# Patient Record
Sex: FEMALE | Race: WHITE | NOT HISPANIC OR LATINO | Employment: FULL TIME | URBAN - METROPOLITAN AREA
[De-identification: names, ages, dates, MRNs, and addresses within clinical notes are randomized per-mention and may not be internally consistent; named-entity substitution may affect disease eponyms.]

---

## 2017-01-04 ENCOUNTER — OFFICE VISIT (OUTPATIENT)
Dept: CARDIOLOGY | Facility: CLINIC | Age: 37
End: 2017-01-04

## 2017-01-04 VITALS
BODY MASS INDEX: 17.08 KG/M2 | SYSTOLIC BLOOD PRESSURE: 152 MMHG | WEIGHT: 122 LBS | HEART RATE: 122 BPM | DIASTOLIC BLOOD PRESSURE: 96 MMHG | HEIGHT: 71 IN

## 2017-01-04 DIAGNOSIS — K50.119 CROHN'S DISEASE OF COLON, UNSPECIFIED COMPLICATION (HCC): ICD-10-CM

## 2017-01-04 DIAGNOSIS — R00.0 TACHYCARDIA: Primary | ICD-10-CM

## 2017-01-04 PROCEDURE — 93000 ELECTROCARDIOGRAM COMPLETE: CPT | Performed by: INTERNAL MEDICINE

## 2017-01-04 PROCEDURE — 99204 OFFICE O/P NEW MOD 45 MIN: CPT | Performed by: INTERNAL MEDICINE

## 2017-01-04 RX ORDER — PREDNISONE 1 MG/1
7.5 TABLET ORAL
COMMUNITY
Start: 2016-11-16

## 2017-01-04 RX ORDER — TRAMADOL HYDROCHLORIDE 300 MG/1
TABLET, EXTENDED RELEASE ORAL
Refills: 1 | COMMUNITY
Start: 2016-12-29

## 2017-01-04 RX ORDER — FENTANYL 25 UG/H
PATCH TRANSDERMAL
Refills: 0 | COMMUNITY
Start: 2016-11-05 | End: 2017-03-14

## 2017-01-04 RX ORDER — PREDNISONE 1 MG/1
TABLET ORAL
COMMUNITY
Start: 2016-11-16 | End: 2018-01-31 | Stop reason: DRUGHIGH

## 2017-01-04 RX ORDER — VENLAFAXINE HYDROCHLORIDE 150 MG/1
CAPSULE, EXTENDED RELEASE ORAL
Refills: 2 | COMMUNITY
Start: 2016-12-28 | End: 2017-03-14

## 2017-01-04 NOTE — PROGRESS NOTES
Date of Office Visit: 2017  Encounter Provider: Claudia Wetzel MD  Place of Service: [facility] CARDIOLOGY  Patient Name: Porsha Vega  :1980      Patient ID:  Porsah Vega is a 36 y.o. female is here for tachycardia.         History of Present Illness     The patient is here today for sinus tachycardia. She got sick in 2016 and came to the emergency department. They did not really know what was going on and dismissed her, but her heart rate was high. She followed with Dr. Lewis, and her heart rate was high with Dr. Lewis as well, but she was otherwise feeling better. However, they discovered that she had transaminitis which they thought was due to liver inflammation, and she was sent to Dr. Chato Mccloud. Dr. Mccloud felt that it might be in part related to her medications for her Crohn disease, and so they began the process of trying to cull down her Crohn disease medications. She has had Crohn disease which has been in remission since . With the tachycardia, she did have her thyroid checked, and this was normal. She has had intermittent elevations of her blood pressure, but it never seems to be sustained. She does take Synthroid for her thyroid. In addition, she is on prednisone, venlafaxine and tramadol.     She has never had any significant use of caffeine, alcohol or decongestants. She does not have any history of sleep apnea, blood clots or cancer. She has no underlying asthma or emphysema; no kidney problems; no seizure, stroke or aneurysm. She has never had rheumatic fever or valvular heart disease of any sort. She has never had heart failure, diabetes mellitus or hyperlipidemia. She is not currently having any issues with her sinuses.     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 This has been very  stressful.     She does not exercise regularly. She uses no illicit drugs. She does not drink a lot of water.     There is a family history of atrial fibrillation in her dad, also a TIA. There is also some hypertension in her paternal grandmother and her dad.         Past Medical History   Diagnosis Date   • Anemia    • Anxiety    • Arthritis    • Asthma    • Crohn's disease      arthropathy   • GERD (gastroesophageal reflux disease)    • Hypertension    • Hypothyroidism    • IBS (irritable bowel syndrome)    • MRSA (methicillin resistant Staphylococcus aureus)    • Pancreatitis    • PUD (peptic ulcer disease)    • Seasonal allergies    • Tachycardia          Past Surgical History   Procedure Laterality Date   • Cholecystectomy     • Other surgical history       laparoscopic surgery for endometriosis       Current Outpatient Prescriptions on File Prior to Visit   Medication Sig Dispense Refill   • ALPRAZolam (XANAX) 0.25 MG tablet Take 0.25 mg by mouth Daily.     • amitriptyline (ELAVIL) 25 MG tablet Take 25 mg by mouth Every Night.     • medroxyPROGESTERone (DEPO-PROVERA) 150 MG/ML injection Inject 150 mg into the shoulder, thigh, or buttocks Every 3 (Three) Months.     • Calcium Carbonate (CALCIUM 600 PO) Take  by mouth 2 (Two) Times a Day.     • CloNIDine (CATAPRES) 0.1 MG tablet Take 0.1 mg by mouth 2 (Two) Times a Day.     • escitalopram (LEXAPRO) 20 MG tablet Take 10 mg by mouth Daily.     • meclizine (ANTIVERT) 25 MG tablet Take 25 mg by mouth Every 8 (Eight) Hours As Needed for dizziness.       No current facility-administered medications on file prior to visit.        Social History     Social History   • Marital status:      Spouse name: N/A   • Number of children: N/A   • Years of education: N/A     Occupational History   • Not on file.     Social History Main Topics   • Smoking status: Never Smoker   • Smokeless tobacco: Not on file   • Alcohol use No   • Drug use: No   • Sexual activity: Not on  "file     Other Topics Concern   • Not on file     Social History Narrative           Review of Systems   Constitution: Positive for malaise/fatigue.   HENT: Negative for congestion and headaches.    Eyes: Negative for vision loss in left eye and vision loss in right eye.   Respiratory: Positive for shortness of breath. Negative for cough, hemoptysis, sleep disturbances due to breathing, snoring, sputum production and wheezing.    Endocrine: Negative.    Hematologic/Lymphatic: Negative.    Skin: Negative for poor wound healing and rash.   Musculoskeletal: Positive for joint pain. Negative for falls, gout, muscle cramps and myalgias.   Gastrointestinal: Negative for abdominal pain, diarrhea, dysphagia, hematemesis, melena, nausea and vomiting.   Neurological: Negative for excessive daytime sleepiness, dizziness, light-headedness, loss of balance, seizures and vertigo.   Psychiatric/Behavioral: Negative for depression and substance abuse. The patient is not nervous/anxious.        Procedures    ECG 12 Lead  Date/Time: 1/4/2017 12:50 PM  Performed by: LULÚ DOSHI  Authorized by: LULÚ DOSHI   Comparison: not compared with previous ECG   Rhythm: sinus tachycardia  Clinical impression: non-specific ECG               Objective:      Vitals:    01/04/17 1225   BP: 152/96   Pulse: (!) 122   Weight: 122 lb (55.3 kg)   Height: 71\" (180.3 cm)     Body mass index is 17.02 kg/(m^2).    Physical Exam   Constitutional: She is oriented to person, place, and time. She appears well-developed and well-nourished. No distress.   HENT:   Head: Normocephalic and atraumatic.   Eyes: Conjunctivae are normal. No scleral icterus.   Neck: Neck supple. No JVD present. Carotid bruit is not present. No thyromegaly present.   Cardiovascular: Regular rhythm, S1 normal, S2 normal, normal heart sounds and intact distal pulses.   No extrasystoles are present. Tachycardia present.  PMI is not displaced.  Exam reveals no gallop.    No " murmur heard.  Pulses:       Carotid pulses are 2+ on the right side, and 2+ on the left side.       Radial pulses are 2+ on the right side, and 2+ on the left side.        Dorsalis pedis pulses are 2+ on the right side, and 2+ on the left side.        Posterior tibial pulses are 2+ on the right side, and 2+ on the left side.   Pulmonary/Chest: Effort normal and breath sounds normal. No respiratory distress. She has no wheezes. She has no rhonchi. She has no rales. She exhibits no tenderness.   Abdominal: Soft. Bowel sounds are normal. She exhibits no distension, no abdominal bruit and no mass. There is no tenderness.   Musculoskeletal: She exhibits no edema or deformity.   Lymphadenopathy:     She has no cervical adenopathy.   Neurological: She is alert and oriented to person, place, and time. No cranial nerve deficit.   Skin: Skin is warm and dry. No rash noted. She is not diaphoretic. No cyanosis. No pallor. Nails show no clubbing.   Psychiatric: She has a normal mood and affect. Judgment normal.   Vitals reviewed.      Lab Review:       Assessment:      Diagnosis Plan   1. Tachycardia     2. Crohn's disease of colon, unspecified complication       1. Sinus tachycardia, like reactive to hepatic inflammation, stress, deconditioning and relative dehydration.   2. Crohn's disease, in remission with last flare in 2012.  Sees Dr. Lewis.  3. Transaminitis, seeing Dr. Chato Mccloud for this liver inflammation which may be due to meds but is still occurring despite medication changes.   4. Stress with losing their house.      Plan:        Advised cardiovascular exercise, 64 ounces of water daily and increased protein consumption. I suspect that the underlying factors in her life currently are driving some of this tachycardia. She does not use any decongestants and no significant amount of alcohol, and she uses very little caffeine. In addition, her thyroid has been well controlled.     See back in 3 months.

## 2017-01-04 NOTE — MR AVS SNAPSHOT
Porsha Vega   1/4/2017 12:00 PM   Office Visit    Dept Phone:  [phone]   Encounter #:  [id number]    Provider:  Claudia Wetzel MD   Department:  [facility] CARDIOLOGY                Your Full Care Plan              Your Updated Medication List          This list is accurate as of: 1/4/17  1:24 PM.  Always use your most recent med list.                ALPRAZolam 0.25 MG tablet   Commonly known as:  XANAX       amitriptyline 25 MG tablet   Commonly known as:  ELAVIL       CALCIUM 600 PO       CloNIDine 0.1 MG tablet   Commonly known as:  CATAPRES       escitalopram 20 MG tablet   Commonly known as:  LEXAPRO       fentaNYL 25 MCG/HR patch   Commonly known as:  DURAGESIC       meclizine 25 MG tablet   Commonly known as:  ANTIVERT       medroxyPROGESTERone 150 MG/ML injection   Commonly known as:  DEPO-PROVERA       * predniSONE 5 MG tablet   Commonly known as:  DELTASONE       * predniSONE 1 MG tablet   Commonly known as:  DELTASONE       traMADol  MG 24 hr tablet   Commonly known as:  ULTRAM-ER       venlafaxine  MG 24 hr capsule   Commonly known as:  EFFEXOR-XR       * Notice:  This list has 2 medication(s) that are the same as other medications prescribed for you. Read the directions carefully, and ask your doctor or other care provider to review them with you.            We Performed the Following     ECG 12 Lead       You Were Diagnosed With        Codes Comments    Tachycardia    -  Primary ICD-10-CM: R00.0  ICD-9-CM: 785.0     Crohn's disease of colon, unspecified complication     ICD-10-CM: K50.119  ICD-9-CM: 555.1       Instructions     None    Patient Instructions History      Upcoming Appointments     Visit Type Date Time Department    NEW PATIENT 1/4/2017 12:00 PM [facility] [place]    FOLLOW UP 4/12/2017 12:40 PM [facility]      MyChart Signup     [facility] [employer] allows you to send messages to your doctor, view your  "test results, renew your prescriptions, schedule appointments, and more. To sign up, go to [employer] and click on the Sign Up Now link in the New User? box. Enter your [employer] Activation Code exactly as it appears below along with the last four digits of your Social Security Number and your Date of Birth () to complete the sign-up process. If you do not sign up before the expiration date, you must request a new code.    [employer] Activation Code: [email or web address]  Expires: 2017  1:24 PM    If you have questions, you can email [email or web address] or call [phone] to talk to our [employer] staff. Remember, [employer] is NOT to be used for urgent needs. For medical emergencies, dial 911.               Other Info from Your Visit           Your Appointments     2017 12:40 PM EDT   Follow Up with Claudia Wetzel MD   [facility] CARDIOLOGY (--)    [address] [place] [address]. [address]  [facility] [address] [phone]           Arrive 15 minutes prior to appointment.              Allergies     Demerol [Meperidine]      Remicade [Infliximab]        Reason for Visit     Rapid Heart Rate           Vital Signs     Blood Pressure Pulse Height Weight Body Mass Index Smoking Status    152/96 122 71\" (180.3 cm) 122 lb (55.3 kg) 17.02 kg/m2 Never Smoker      Problems and Diagnoses Noted     Regional colitis    Fast heart beat      Results         "

## 2017-01-06 ENCOUNTER — OFFICE (OUTPATIENT)
Dept: URBAN - METROPOLITAN AREA CLINIC 75 | Facility: CLINIC | Age: 37
End: 2017-01-06

## 2017-01-06 VITALS
HEIGHT: 71 IN | WEIGHT: 143 LBS | HEART RATE: 112 BPM | SYSTOLIC BLOOD PRESSURE: 110 MMHG | DIASTOLIC BLOOD PRESSURE: 68 MMHG

## 2017-01-06 DIAGNOSIS — K58.9 IRRITABLE BOWEL SYNDROME WITHOUT DIARRHEA: ICD-10-CM

## 2017-01-06 DIAGNOSIS — T50.905A ADVERSE EFFECT OF UNSPECIFIED DRUGS, MEDICAMENTS AND BIOLOGI: ICD-10-CM

## 2017-01-06 DIAGNOSIS — K75.81 NONALCOHOLIC STEATOHEPATITIS (NASH): ICD-10-CM

## 2017-01-06 DIAGNOSIS — R74.8 ABNORMAL LEVELS OF OTHER SERUM ENZYMES: ICD-10-CM

## 2017-01-06 DIAGNOSIS — K50.90 CROHN'S DISEASE, UNSPECIFIED, WITHOUT COMPLICATIONS: ICD-10-CM

## 2017-01-06 DIAGNOSIS — R10.10 UPPER ABDOMINAL PAIN, UNSPECIFIED: ICD-10-CM

## 2017-01-06 DIAGNOSIS — K21.9 GASTRO-ESOPHAGEAL REFLUX DISEASE WITHOUT ESOPHAGITIS: ICD-10-CM

## 2017-01-06 DIAGNOSIS — Z79.52 LONG TERM (CURRENT) USE OF SYSTEMIC STEROIDS: ICD-10-CM

## 2017-01-06 PROCEDURE — 99215 OFFICE O/P EST HI 40 MIN: CPT

## 2017-03-09 ENCOUNTER — TELEPHONE (OUTPATIENT)
Dept: CARDIOLOGY | Facility: CLINIC | Age: 37
End: 2017-03-09

## 2017-03-09 NOTE — TELEPHONE ENCOUNTER
03/09/17  9:29 AM  Porsha Vega  1980    Home Phone [phone]   Mobile [phone]     Ms. Vega calls to be seen sooner than her appt with Dr. Wetzel on 4/12/17. She states that she has been noticing that her heart has been skipping beats more often recently. She has not measured her HR or BP. She feels lightheaded with activity.     At her last visit, Dr. Wetzel recommended increasing her water and protein intake. She states she is doing this, but that she is losing weight.     I scheduled her to see Paula Ramirez on 3/14 at 1130 when Dr. Wetzel is in clinic.     Binta LYNCH RN

## 2017-03-14 ENCOUNTER — OFFICE VISIT (OUTPATIENT)
Dept: CARDIOLOGY | Facility: CLINIC | Age: 37
End: 2017-03-14

## 2017-03-14 ENCOUNTER — TELEPHONE (OUTPATIENT)
Dept: CARDIOLOGY | Facility: CLINIC | Age: 37
End: 2017-03-14

## 2017-03-14 VITALS
HEART RATE: 122 BPM | SYSTOLIC BLOOD PRESSURE: 104 MMHG | HEIGHT: 71 IN | DIASTOLIC BLOOD PRESSURE: 80 MMHG | WEIGHT: 140.5 LBS | BODY MASS INDEX: 19.67 KG/M2

## 2017-03-14 DIAGNOSIS — M19.90 ARTHRITIS: ICD-10-CM

## 2017-03-14 DIAGNOSIS — K50.119 CROHN'S DISEASE OF COLON, UNSPECIFIED COMPLICATION (HCC): ICD-10-CM

## 2017-03-14 DIAGNOSIS — R00.0 TACHYCARDIA: Primary | ICD-10-CM

## 2017-03-14 DIAGNOSIS — R50.9 LOW GRADE FEVER: ICD-10-CM

## 2017-03-14 PROCEDURE — 93000 ELECTROCARDIOGRAM COMPLETE: CPT | Performed by: NURSE PRACTITIONER

## 2017-03-14 PROCEDURE — 99213 OFFICE O/P EST LOW 20 MIN: CPT | Performed by: NURSE PRACTITIONER

## 2017-03-14 RX ORDER — FLUOXETINE HYDROCHLORIDE 20 MG/1
20 CAPSULE ORAL DAILY
COMMUNITY

## 2017-03-14 NOTE — PROGRESS NOTES
Date of Office Visit: 2017  Encounter Provider: ROXANA Holly  Place of Service: [facility] CARDIOLOGY  Patient Name: Porsha Vega  :1980    Chief Complaint   Patient presents with   • Rapid Heart Rate   :     HPI: Porsha Vega is a 37 y.o. female comes in today for the lightheadedness and palpitations.  She is a patient of Dr. Wetzel.  I'm seeing her for the first time today and have reviewed her records. She first came to see Dr. Wetzel in 2017.  In 2016 she came to the emergency room was found to have a high heart rate.She followed with Dr. Lewis, and her heart rate was high with Dr. Lewis as well, but she was otherwise feeling better. However, they discovered that she had transaminitis which they thought was due to liver inflammation, and she was sent to Dr. Chato Mccloud. Dr. Mccloud felt that it might be in part related to her medications for her Crohn disease, and so they began the process of trying to cut down her Crohn disease medications. She has had Crohn disease which has been in remission since . With the tachycardia, she did have her thyroid checked, and this was normal. She has had intermittent elevations of her blood pressure, but it never seems to be sustained. She does take Synthroid for her thyroid. In addition, she is on prednisone, venlafaxine and tramadol.     Dr. Wetzel felt that her tachycardia was likely reactive and related to hepatic inflammation, stress, deconditioning and dehydration. Advised exercise, increase water intake and protein consumption.     She called on 3/9/17, requesting that she be able to come in earlier than her recommended 3 months appt due to the increase of lightheadedness with activity and feeling of her heart skipping beats.     Today, she comes in with these complaints. She is in the process of moving and has been doing strenous work over the past week. She has noticed heart beating harder and felt  like it will skip a beat. She seems like the 4th beat it will skip. She was started on effexor about 2 months prior and felt like her muscles were getting sore due to the medication. She stopped this medication about 3 weeks and still feels muscle soreness and a feeling of her leg shaking when standing. She does feel like she is going to pass out when has been standing for a long period of time. Dizziness when stand up or change position and/or move head. Seh then starts becoming diaporetic. Low grade fever over the past week over the past couple of days. She has previuosly seen a rheumatologist.     Past Medical History   Diagnosis Date   • Anemia    • Anxiety    • Arthritis    • Asthma    • Crohn's disease      arthropathy   • GERD (gastroesophageal reflux disease)    • Hypertension    • Hypothyroidism    • IBS (irritable bowel syndrome)    • MRSA (methicillin resistant Staphylococcus aureus)    • Pancreatitis    • PUD (peptic ulcer disease)    • Seasonal allergies    • Tachycardia        Past Surgical History   Procedure Laterality Date   • Cholecystectomy     • Other surgical history       laparoscopic surgery for endometriosis           Review of Systems   Constitution: Positive for fever (low grade) and malaise/fatigue.   HENT: Positive for sore throat. Negative for ear pain, hearing loss and nosebleeds.    Eyes: Negative for double vision, pain, vision loss in left eye, vision loss in right eye and visual disturbance.   Cardiovascular: Positive for dyspnea on exertion. Negative for claudication and leg swelling.   Respiratory: Negative for cough, snoring and wheezing.    Endocrine: Negative for cold intolerance, heat intolerance and polyuria.   Skin: Negative for color change, itching and rash.   Musculoskeletal: Positive for joint pain and joint swelling. Negative for muscle cramps.   Gastrointestinal: Positive for abdominal pain. Negative for diarrhea, melena, nausea and vomiting.   Genitourinary: Negative  "for bladder incontinence and hematuria.   Neurological: Positive for paresthesias. Negative for excessive daytime sleepiness, dizziness, light-headedness and seizures.   Psychiatric/Behavioral: Negative for depression. The patient is not nervous/anxious.    All other systems reviewed and are negative.    All other systems reviewed and are negative    Allergies   Allergen Reactions   • Demerol [Meperidine]    • Remicade [Infliximab]        All aspects of family and social history reviewed.          Objective:     Vitals:    03/14/17 0920   BP: 104/80   BP Location: Right arm   Pulse: (!) 122   Weight: 140 lb 8 oz (63.7 kg)   Height: 71\" (180.3 cm)     Body mass index is 19.6 kg/(m^2).    PHYSICAL EXAM:  Physical Exam   Constitutional: She is oriented to person, place, and time. She appears well-developed and well-nourished. No distress.   HENT:   Head: Normocephalic and atraumatic.   Eyes: Conjunctivae are normal. No scleral icterus.   Neck: Neck supple. No JVD present. Carotid bruit is not present. No thyromegaly present.   Cardiovascular: Regular rhythm, S1 normal, S2 normal, normal heart sounds and intact distal pulses.   No extrasystoles are present. Tachycardia present.  PMI is not displaced.  Exam reveals no gallop.    No murmur heard.  Pulses:       Carotid pulses are 2+ on the right side, and 2+ on the left side.       Radial pulses are 2+ on the right side, and 2+ on the left side.        Dorsalis pedis pulses are 2+ on the right side, and 2+ on the left side.        Posterior tibial pulses are 2+ on the right side, and 2+ on the left side.   Pulmonary/Chest: Effort normal and breath sounds normal. No respiratory distress. She has no wheezes. She has no rhonchi. She has no rales. She exhibits no tenderness.   Abdominal: Soft. Bowel sounds are normal. She exhibits no distension, no abdominal bruit and no mass. There is no tenderness.   Musculoskeletal: She exhibits no edema or deformity.   Lymphadenopathy:    "  She has no cervical adenopathy.   Neurological: She is alert and oriented to person, place, and time. No cranial nerve deficit.   Skin: Skin is warm and dry. No rash noted. She is not diaphoretic. No cyanosis. No pallor. Nails show no clubbing.   Psychiatric: She has a normal mood and affect. Judgment normal.   Vitals reviewed.        ECG 12 Lead  Date/Time: 3/14/2017 9:49 AM  Performed by: MATI TOMLINSON  Authorized by: MATI TOMLINSON   Comparison: compared with previous ECG from 1/4/2017  Similar to previous ECG  Rhythm: sinus tachycardia  Rate: normal  BPM: 122  Conduction: conduction normal  ST Segments: ST segments normal  T Waves: T waves normal  QRS axis: normal  Other: no other findings  Clinical impression: abnormal ECG  Comments: Indication: tachycardia                Assessment:       Diagnosis Plan   1. Tachycardia  Ambulatory Referral to Rheumatology   2. Low grade fever  Ambulatory Referral to Rheumatology   3. Arthritis  Ambulatory Referral to Rheumatology   4. Crohn's disease of colon, unspecified complication  Ambulatory Referral to Rheumatology        Orders Placed This Encounter   Procedures   • Ambulatory Referral to Rheumatology     Referral Priority:   Routine     Referral Type:   Consultation     Referral Reason:   Specialty Services Required     Requested Specialty:   Rheumatology     Number of Visits Requested:   1   • ECG 12 Lead     This order was created via procedure documentation       Current Outpatient Prescriptions   Medication Sig Dispense Refill   • ALPRAZolam (XANAX) 0.25 MG tablet Take 0.25 mg by mouth Daily.     • amitriptyline (ELAVIL) 25 MG tablet Take 25 mg by mouth Every Night.     • FLUoxetine (PROzac) 20 MG capsule Take 20 mg by mouth Daily.     • medroxyPROGESTERone (DEPO-PROVERA) 150 MG/ML injection Inject 150 mg into the shoulder, thigh, or buttocks Every 3 (Three) Months.     • predniSONE (DELTASONE) 1 MG tablet      • predniSONE (DELTASONE) 5 MG tablet      •  traMADol ER (ULTRAM-ER) 300 MG 24 hr tablet TAKE 1 TABLET EVERY DAY  1     No current facility-administered medications for this visit.             Plan:       1. Tachycardia-patient still with complaints of tachycardia.  She is complaining that she also has had a low-grade fever over the past week.  She's having some dizziness as well.  She is going to see primary care, who recommends allergy medicine and prednisone.  She is not taking the increased dose of prednisone.  She has Crohn's disease and arthritis related to Crohn's disease.  I feel that her tachycardia is reactive and she likely has some kind of autoimmune disorder going on.  She was previously in to see a rheumatologist but had to cancel as the patient has her provider left the practice.  I've discussed with Dr. Wetzel, and we have recommended that she see a rheumatologist.  I'm going to put the consult and for her.  She can keep her follow-up as previously scheduled.  Starting a beta blocker would only mask the underlying issue.  No medication to be prescribed at this time.    As always, it has been a pleasure to participate in this patient's care.      Sincerely,      ROXANA Holly

## 2017-03-14 NOTE — TELEPHONE ENCOUNTER
3/14/17  4:20  Pt left vmsg - states she called her old rheumatologist and now has an appt with him so she does not need the referral to see a new rheumatologist./hannah

## 2017-03-31 ENCOUNTER — TELEPHONE (OUTPATIENT)
Dept: CARDIOLOGY | Facility: CLINIC | Age: 37
End: 2017-03-31

## 2017-03-31 RX ORDER — DILTIAZEM HYDROCHLORIDE 120 MG/1
120 CAPSULE, EXTENDED RELEASE ORAL NIGHTLY
Qty: 90 CAPSULE | Refills: 3 | Status: SHIPPED | OUTPATIENT
Start: 2017-03-31 | End: 2018-01-31 | Stop reason: ALTCHOICE

## 2017-03-31 NOTE — TELEPHONE ENCOUNTER
----- Message from Claudia Wetzel MD sent at 3/31/2017 10:44 AM EDT -----  pls call and let her know taht I spoke with Dr. Lewis and am sending in a script for diltiazem to take at night only.     Thanks  RM

## 2017-04-06 ENCOUNTER — OFFICE (OUTPATIENT)
Dept: URBAN - METROPOLITAN AREA CLINIC 75 | Facility: CLINIC | Age: 37
End: 2017-04-06

## 2017-04-06 VITALS
WEIGHT: 138 LBS | SYSTOLIC BLOOD PRESSURE: 118 MMHG | HEART RATE: 64 BPM | HEIGHT: 71 IN | DIASTOLIC BLOOD PRESSURE: 80 MMHG

## 2017-04-06 DIAGNOSIS — K58.9 IRRITABLE BOWEL SYNDROME WITHOUT DIARRHEA: ICD-10-CM

## 2017-04-06 DIAGNOSIS — R10.10 UPPER ABDOMINAL PAIN, UNSPECIFIED: ICD-10-CM

## 2017-04-06 DIAGNOSIS — R74.8 ABNORMAL LEVELS OF OTHER SERUM ENZYMES: ICD-10-CM

## 2017-04-06 DIAGNOSIS — T50.905S ADVERSE EFFECT OF UNSPECIFIED DRUGS, MEDICAMENTS AND BIOLOGI: ICD-10-CM

## 2017-04-06 DIAGNOSIS — K50.90 CROHN'S DISEASE, UNSPECIFIED, WITHOUT COMPLICATIONS: ICD-10-CM

## 2017-04-06 DIAGNOSIS — Z79.52 LONG TERM (CURRENT) USE OF SYSTEMIC STEROIDS: ICD-10-CM

## 2017-04-06 DIAGNOSIS — K75.81 NONALCOHOLIC STEATOHEPATITIS (NASH): ICD-10-CM

## 2017-04-06 PROCEDURE — 99214 OFFICE O/P EST MOD 30 MIN: CPT

## 2017-08-09 ENCOUNTER — OFFICE (OUTPATIENT)
Dept: URBAN - METROPOLITAN AREA CLINIC 75 | Facility: CLINIC | Age: 37
End: 2017-08-09

## 2017-08-09 VITALS
WEIGHT: 158 LBS | HEART RATE: 105 BPM | DIASTOLIC BLOOD PRESSURE: 80 MMHG | HEIGHT: 71 IN | SYSTOLIC BLOOD PRESSURE: 122 MMHG

## 2017-08-09 DIAGNOSIS — Z79.52 LONG TERM (CURRENT) USE OF SYSTEMIC STEROIDS: ICD-10-CM

## 2017-08-09 DIAGNOSIS — K58.9 IRRITABLE BOWEL SYNDROME WITHOUT DIARRHEA: ICD-10-CM

## 2017-08-09 DIAGNOSIS — K75.81 NONALCOHOLIC STEATOHEPATITIS (NASH): ICD-10-CM

## 2017-08-09 DIAGNOSIS — K50.90 CROHN'S DISEASE, UNSPECIFIED, WITHOUT COMPLICATIONS: ICD-10-CM

## 2017-08-09 DIAGNOSIS — L02.91 CUTANEOUS ABSCESS, UNSPECIFIED: ICD-10-CM

## 2017-08-09 DIAGNOSIS — Z92.25 PERSONAL HISTORY OF IMMUNOSUPPRESSION THERAPY: ICD-10-CM

## 2017-08-09 DIAGNOSIS — K21.9 GASTRO-ESOPHAGEAL REFLUX DISEASE WITHOUT ESOPHAGITIS: ICD-10-CM

## 2017-08-09 PROCEDURE — 99214 OFFICE O/P EST MOD 30 MIN: CPT

## 2017-09-12 ENCOUNTER — OFFICE (OUTPATIENT)
Dept: URBAN - METROPOLITAN AREA CLINIC 75 | Facility: CLINIC | Age: 37
End: 2017-09-12

## 2017-12-05 ENCOUNTER — OFFICE (OUTPATIENT)
Dept: URBAN - METROPOLITAN AREA CLINIC 75 | Facility: CLINIC | Age: 37
End: 2017-12-05
Payer: COMMERCIAL

## 2017-12-05 VITALS
DIASTOLIC BLOOD PRESSURE: 80 MMHG | HEIGHT: 71 IN | WEIGHT: 174 LBS | SYSTOLIC BLOOD PRESSURE: 120 MMHG | HEART RATE: 110 BPM

## 2017-12-05 DIAGNOSIS — K58.9 IRRITABLE BOWEL SYNDROME WITHOUT DIARRHEA: ICD-10-CM

## 2017-12-05 DIAGNOSIS — Z79.52 LONG TERM (CURRENT) USE OF SYSTEMIC STEROIDS: ICD-10-CM

## 2017-12-05 DIAGNOSIS — Z92.25 PERSONAL HISTORY OF IMMUNOSUPPRESSION THERAPY: ICD-10-CM

## 2017-12-05 DIAGNOSIS — K50.90 CROHN'S DISEASE, UNSPECIFIED, WITHOUT COMPLICATIONS: ICD-10-CM

## 2017-12-05 DIAGNOSIS — K75.81 NONALCOHOLIC STEATOHEPATITIS (NASH): ICD-10-CM

## 2017-12-05 DIAGNOSIS — K21.9 GASTRO-ESOPHAGEAL REFLUX DISEASE WITHOUT ESOPHAGITIS: ICD-10-CM

## 2017-12-05 PROCEDURE — 99214 OFFICE O/P EST MOD 30 MIN: CPT

## 2018-01-29 ENCOUNTER — TELEPHONE (OUTPATIENT)
Dept: CARDIOLOGY | Facility: CLINIC | Age: 38
End: 2018-01-29

## 2018-01-29 NOTE — TELEPHONE ENCOUNTER
01/29/18  11:23 AM  Porsha Vega  1980    Home Phone [phone]   Mobile [phone]     Ms. Vega is a patient of Dr. Wetzel's last seen in March 2017 for reactive tachycardia. She has a history of Crohn's disease. Dr. Wetzel started her on cardizem at that time.    Ms. Vega states she has been feeling SOA for the past couple of weeks. It is made worse with breathing and movement. She states her chest is not tender to touch. She has had a cough, but denies fever. She denies edema. Her /70 with HR 80-90.     She states that she is not taking cardizem. This was stopped by another physician. She is taking metoprolol ER once daily. She is unsure of the dose. Her pharmacy states she has 50mg tablets.     Does she need to be seen? Dr. Wetzel has a MA hold spot next Thursday at the end of her CEC day. You saw her on 3/14/17.    Binta LYNCH RN

## 2018-01-29 NOTE — TELEPHONE ENCOUNTER
01/29/18  12:06 PM  I called patient and she agrees to come in to see Paula Ramirez on 1/31 at 1000.    Can someone add this patient to Paula's Schedule for 1/31 at 1000? She is a patient of Dr. Clifton and is being seen for SOA.    Thanks!  Binta LYNCH RN

## 2018-01-31 ENCOUNTER — OFFICE VISIT (OUTPATIENT)
Dept: CARDIOLOGY | Facility: CLINIC | Age: 38
End: 2018-01-31

## 2018-01-31 VITALS
DIASTOLIC BLOOD PRESSURE: 64 MMHG | WEIGHT: 175 LBS | BODY MASS INDEX: 24.5 KG/M2 | HEIGHT: 71 IN | HEART RATE: 95 BPM | SYSTOLIC BLOOD PRESSURE: 98 MMHG

## 2018-01-31 DIAGNOSIS — R06.09 DYSPNEA ON EXERTION: ICD-10-CM

## 2018-01-31 DIAGNOSIS — R07.2 PRECORDIAL PAIN: Primary | ICD-10-CM

## 2018-01-31 PROCEDURE — 99214 OFFICE O/P EST MOD 30 MIN: CPT | Performed by: NURSE PRACTITIONER

## 2018-01-31 PROCEDURE — 93000 ELECTROCARDIOGRAM COMPLETE: CPT | Performed by: NURSE PRACTITIONER

## 2018-01-31 RX ORDER — LEVOTHYROXINE SODIUM 0.2 MG/1
200 TABLET ORAL DAILY
Refills: 5 | COMMUNITY
Start: 2017-12-27 | End: 2019-03-27

## 2018-01-31 RX ORDER — METOPROLOL SUCCINATE 50 MG/1
50 TABLET, EXTENDED RELEASE ORAL DAILY
Qty: 90 TABLET | Refills: 3 | Status: SHIPPED | OUTPATIENT
Start: 2018-01-31 | End: 2018-09-24 | Stop reason: DRUGHIGH

## 2018-01-31 RX ORDER — METOPROLOL SUCCINATE 50 MG/1
50 TABLET, EXTENDED RELEASE ORAL DAILY
COMMUNITY
Start: 2017-05-15 | End: 2018-01-31 | Stop reason: SDUPTHER

## 2018-01-31 NOTE — PROGRESS NOTES
Date of Office Visit: 2018  Encounter Provider: ROXANA Holly  Place of Service: [facility] CARDIOLOGY  Patient Name: Porsha Vega  :1980    Chief Complaint   Patient presents with   • Shortness of Breath   :     HPI: Porsha Vega is a 38 y.o. female comes in today for shortness of breath.     She has a history of Crohn's disease she's also had reactive tachycardia.  She's been to see a rheumatologist for her Crohn's disease.  In 2017, she was started on diltiazem. Endocrinology switched to metoprolol as her thyroid was high.     She reports that any time she tries to come off metoprolol her heart rate shoots up and she has PVCS. Now she is experiencing left sided chest pain.     Chest Pain: Patient complains of chest pain. Onset was 3 weeks ago, with worsening course since that time. The patient describes the pain as intermittent, substernal and stabbing pain on the left side. Or more under breast in nature, does not radiate. Patient rates pain as a 8/10 in intensity.  Associated symptoms are dyspnea. Aggravating factors are deep inspiration.  Alleviating factors are: none. Patient's cardiac risk factors are none.  Patient's risk factors for DVT/PE: oral contraceptive use. Previous cardiac testing: chest x-ray.        Past Medical History:   Diagnosis Date   • Anemia    • Anxiety    • Arthritis    • Asthma    • Crohn's disease     arthropathy   • GERD (gastroesophageal reflux disease)    • Hypertension    • Hypothyroidism    • IBS (irritable bowel syndrome)    • MRSA (methicillin resistant Staphylococcus aureus)    • Pancreatitis    • PUD (peptic ulcer disease)    • Seasonal allergies    • Tachycardia        Past Surgical History:   Procedure Laterality Date   • CHOLECYSTECTOMY     • OTHER SURGICAL HISTORY      laparoscopic surgery for endometriosis           Review of Systems   Constitution: Positive for malaise/fatigue. Negative for fever.   HENT: Negative  "for ear pain, hearing loss, nosebleeds and sore throat.    Eyes: Negative for double vision, pain, vision loss in left eye, vision loss in right eye and visual disturbance.   Cardiovascular: Positive for chest pain and dyspnea on exertion. Negative for claudication and leg swelling.   Respiratory: Negative for cough, snoring and wheezing.    Endocrine: Negative for cold intolerance, heat intolerance and polyuria.   Skin: Negative for color change, itching and rash.   Musculoskeletal: Negative for joint pain, joint swelling and muscle cramps.   Gastrointestinal: Negative for abdominal pain, diarrhea, melena, nausea and vomiting.   Genitourinary: Negative for bladder incontinence and hematuria.   Neurological: Negative for excessive daytime sleepiness, dizziness, light-headedness, paresthesias and seizures.   Psychiatric/Behavioral: Negative for depression. The patient is not nervous/anxious.    All other systems reviewed and are negative.    All other systems reviewed and are negative    Allergies   Allergen Reactions   • Demerol [Meperidine]    • Remicade [Infliximab]        All aspects of family and social history reviewed.          Objective:     Vitals:    01/31/18 1008   BP: 98/64   BP Location: Left arm   Pulse: 95   Weight: 79.4 kg (175 lb)   Height: 180.3 cm (71\")     Body mass index is 24.41 kg/(m^2).    PHYSICAL EXAM:  Physical Exam   Constitutional: She is oriented to person, place, and time. She appears well-developed and well-nourished. No distress.   HENT:   Head: Normocephalic and atraumatic.   Eyes: Conjunctivae are normal. No scleral icterus.   Neck: Neck supple. No JVD present. Carotid bruit is not present. No thyromegaly present.   Cardiovascular: Normal rate, regular rhythm, S1 normal, S2 normal, normal heart sounds and intact distal pulses.   No extrasystoles are present. PMI is not displaced.  Exam reveals no gallop.    No murmur heard.  Pulses:       Carotid pulses are 2+ on the right side, and " 2+ on the left side.       Radial pulses are 2+ on the right side, and 2+ on the left side.        Dorsalis pedis pulses are 2+ on the right side, and 2+ on the left side.        Posterior tibial pulses are 2+ on the right side, and 2+ on the left side.   Pulmonary/Chest: Effort normal and breath sounds normal. No respiratory distress. She has no wheezes. She has no rhonchi. She has no rales. She exhibits no tenderness.   Abdominal: Soft. Bowel sounds are normal. She exhibits no distension, no abdominal bruit and no mass. There is no tenderness.   Musculoskeletal: She exhibits no edema or deformity.   Lymphadenopathy:     She has no cervical adenopathy.   Neurological: She is alert and oriented to person, place, and time. No cranial nerve deficit.   Skin: Skin is warm and dry. No rash noted. She is not diaphoretic. No cyanosis. No pallor. Nails show no clubbing.   Psychiatric: She has a normal mood and affect. Judgment normal.   Vitals reviewed.        ECG 12 Lead  Date/Time: 1/31/2018 10:46 AM  Performed by: MATI TOMLINSON  Authorized by: MATI TOMLINSON   Comparison: compared with previous ECG from 3/14/2017  Similar to previous ECG  Rhythm: sinus rhythm  Rate: normal  BPM: 95  Conduction: conduction normal  ST Segments: ST segments normal  T Waves: T waves normal  QRS axis: normal  Other: no other findings  Clinical impression: abnormal ECG  Comments: Indication: tachycardia, chest pain                Assessment:       Diagnosis Plan   1. Precordial pain  CT Angiogram Chest With & Without Contrast    Adult Transthoracic Echo Complete W/ Cont if Necessary Per Protocol   2. Dyspnea on exertion  CT Angiogram Chest With & Without Contrast    Adult Transthoracic Echo Complete W/ Cont if Necessary Per Protocol        Orders Placed This Encounter   Procedures   • CT Angiogram Chest With & Without Contrast     Order Specific Question:   Patient Pregnant     Answer:   No   • ECG 12 Lead     This order was created via  procedure documentation   • Adult Transthoracic Echo Complete W/ Cont if Necessary Per Protocol     Standing Status:   Future     Order Specific Question:   Reason for exam?     Answer:   Chest Pain     Order Specific Question:   Chest pain specification?     Answer:   Atypical Chest Pain       Current Outpatient Prescriptions   Medication Sig Dispense Refill   • ALPRAZolam (XANAX) 0.25 MG tablet Take 0.25 mg by mouth Daily.     • amitriptyline (ELAVIL) 25 MG tablet Take 25 mg by mouth Every Night.     • Calcium Citrate-Vitamin D (SM CALCIUM CITRATE+/VIT D3 PO) Take 1 tablet by mouth Daily. 630 mg/500  iu one tablet daily     • FLUoxetine (PROzac) 20 MG capsule Take 20 mg by mouth Daily.     • levothyroxine (SYNTHROID, LEVOTHROID) 200 MCG tablet Take 200 mcg by mouth Daily.  5   • medroxyPROGESTERone (DEPO-PROVERA) 150 MG/ML injection Inject 150 mg into the shoulder, thigh, or buttocks Every 3 (Three) Months.     • metoprolol succinate XL (TOPROL-XL) 50 MG 24 hr tablet Take 1 tablet by mouth Daily. 90 tablet 3   • Multiple Vitamin (MULTI VITAMIN DAILY PO) Take 1 tablet by mouth Daily.     • predniSONE (DELTASONE) 5 MG tablet      • traMADol ER (ULTRAM-ER) 300 MG 24 hr tablet TAKE 1 TABLET EVERY DAY  1   • Ustekinumab (STELARA SC) Inject  under the skin. Injection 1 every 8 weeks       No current facility-administered medications for this visit.             Plan:       1.  Precordial pain-low risk for coronary disease.  Is very unlikely that this is related to ischemia.  Nonspecific chest pain as well.  With her history of autoimmune disease, and on Depo-Provera, I would be concerned about pulmonary emboli. Will order CTA for evaluation. I think a ddimer will be very non specific in her as she has so much inflammatin. She is already on steroids. Will also check an echo.     2. Tachycardia-continue on metoprolol        Follow up in office in after testing    As always, it has been a pleasure to participate in this  patient's care.      Sincerely,      ROXANA Holly

## 2018-02-07 ENCOUNTER — HOSPITAL ENCOUNTER (OUTPATIENT)
Dept: CT IMAGING | Facility: HOSPITAL | Age: 38
Discharge: HOME | End: 2018-02-07

## 2018-02-07 ENCOUNTER — HOSPITAL ENCOUNTER (OUTPATIENT)
Dept: CARDIOLOGY | Facility: HOSPITAL | Age: 38
Discharge: HOME | End: 2018-02-07
Admitting: NURSE PRACTITIONER

## 2018-02-07 VITALS
DIASTOLIC BLOOD PRESSURE: 70 MMHG | WEIGHT: 175 LBS | BODY MASS INDEX: 25.05 KG/M2 | HEIGHT: 70 IN | HEART RATE: 113 BPM | SYSTOLIC BLOOD PRESSURE: 110 MMHG

## 2018-02-07 DIAGNOSIS — R07.2 PRECORDIAL PAIN: ICD-10-CM

## 2018-02-07 DIAGNOSIS — R06.09 DYSPNEA ON EXERTION: ICD-10-CM

## 2018-02-07 LAB
ASCENDING AORTA: 2.9 CM
BH CV ECHO MEAS - ACS: 1.8 CM
BH CV ECHO MEAS - AO MAX PG (FULL): 2.2 MMHG
BH CV ECHO MEAS - AO MAX PG: 5.4 MMHG
BH CV ECHO MEAS - AO MEAN PG (FULL): 1.1 MMHG
BH CV ECHO MEAS - AO MEAN PG: 3.3 MMHG
BH CV ECHO MEAS - AO ROOT AREA (BSA CORRECTED): 1.4
BH CV ECHO MEAS - AO ROOT AREA: 6.1 CM^2
BH CV ECHO MEAS - AO ROOT DIAM: 2.8 CM
BH CV ECHO MEAS - AO V2 MAX: 116.4 CM/SEC
BH CV ECHO MEAS - AO V2 MEAN: 86.5 CM/SEC
BH CV ECHO MEAS - AO V2 VTI: 18.6 CM
BH CV ECHO MEAS - AVA(I,A): 2.2 CM^2
BH CV ECHO MEAS - AVA(I,D): 2.2 CM^2
BH CV ECHO MEAS - AVA(V,A): 2.2 CM^2
BH CV ECHO MEAS - AVA(V,D): 2.2 CM^2
BH CV ECHO MEAS - BSA(HAYCOCK): 2 M^2
BH CV ECHO MEAS - BSA: 2 M^2
BH CV ECHO MEAS - BZI_BMI: 24.4 KILOGRAMS/M^2
BH CV ECHO MEAS - BZI_METRIC_HEIGHT: 180.3 CM
BH CV ECHO MEAS - BZI_METRIC_WEIGHT: 79.4 KG
BH CV ECHO MEAS - CONTRAST EF (2CH): 62.9 ML/M^2
BH CV ECHO MEAS - CONTRAST EF 4CH: 66.1 ML/M^2
BH CV ECHO MEAS - EDV(CUBED): 64.4 ML
BH CV ECHO MEAS - EDV(MOD-SP2): 70 ML
BH CV ECHO MEAS - EDV(MOD-SP4): 109 ML
BH CV ECHO MEAS - EDV(TEICH): 70.4 ML
BH CV ECHO MEAS - EF(CUBED): 68.8 %
BH CV ECHO MEAS - EF(MOD-SP2): 62.9 %
BH CV ECHO MEAS - EF(MOD-SP4): 66.1 %
BH CV ECHO MEAS - EF(TEICH): 60.9 %
BH CV ECHO MEAS - ESV(CUBED): 20.1 ML
BH CV ECHO MEAS - ESV(MOD-SP2): 26 ML
BH CV ECHO MEAS - ESV(MOD-SP4): 37 ML
BH CV ECHO MEAS - ESV(TEICH): 27.5 ML
BH CV ECHO MEAS - FS: 32.1 %
BH CV ECHO MEAS - IVS/LVPW: 1
BH CV ECHO MEAS - IVSD: 0.93 CM
BH CV ECHO MEAS - LAT PEAK E' VEL: 15 CM/SEC
BH CV ECHO MEAS - LV DIASTOLIC VOL/BSA (35-75): 54.7 ML/M^2
BH CV ECHO MEAS - LV MASS(C)D: 115.3 GRAMS
BH CV ECHO MEAS - LV MASS(C)DI: 57.9 GRAMS/M^2
BH CV ECHO MEAS - LV MAX PG: 3.3 MMHG
BH CV ECHO MEAS - LV MEAN PG: 2.2 MMHG
BH CV ECHO MEAS - LV SYSTOLIC VOL/BSA (12-30): 18.6 ML/M^2
BH CV ECHO MEAS - LV V1 MAX: 90.2 CM/SEC
BH CV ECHO MEAS - LV V1 MEAN: 72.3 CM/SEC
BH CV ECHO MEAS - LV V1 VTI: 14.5 CM
BH CV ECHO MEAS - LVIDD: 4 CM
BH CV ECHO MEAS - LVIDS: 2.7 CM
BH CV ECHO MEAS - LVLD AP2: 7.2 CM
BH CV ECHO MEAS - LVLD AP4: 7.3 CM
BH CV ECHO MEAS - LVLS AP2: 6.4 CM
BH CV ECHO MEAS - LVLS AP4: 5.8 CM
BH CV ECHO MEAS - LVOT AREA (M): 2.8 CM^2
BH CV ECHO MEAS - LVOT AREA: 2.9 CM^2
BH CV ECHO MEAS - LVOT DIAM: 1.9 CM
BH CV ECHO MEAS - LVPWD: 0.93 CM
BH CV ECHO MEAS - MED PEAK E' VEL: 5 CM/SEC
BH CV ECHO MEAS - MV A DUR: 0.11 SEC
BH CV ECHO MEAS - MV A MAX VEL: 96.5 CM/SEC
BH CV ECHO MEAS - MV DEC SLOPE: 252.6 CM/SEC^2
BH CV ECHO MEAS - MV DEC TIME: 0.2 SEC
BH CV ECHO MEAS - MV E MAX VEL: 52.4 CM/SEC
BH CV ECHO MEAS - MV E/A: 0.54
BH CV ECHO MEAS - MV MAX PG: 4.6 MMHG
BH CV ECHO MEAS - MV MEAN PG: 2.4 MMHG
BH CV ECHO MEAS - MV P1/2T MAX VEL: 53.4 CM/SEC
BH CV ECHO MEAS - MV P1/2T: 61.9 MSEC
BH CV ECHO MEAS - MV V2 MAX: 106.9 CM/SEC
BH CV ECHO MEAS - MV V2 MEAN: 74.1 CM/SEC
BH CV ECHO MEAS - MV V2 VTI: 13.5 CM
BH CV ECHO MEAS - MVA P1/2T LCG: 4.1 CM^2
BH CV ECHO MEAS - MVA(P1/2T): 3.6 CM^2
BH CV ECHO MEAS - MVA(VTI): 3.1 CM^2
BH CV ECHO MEAS - PA ACC TIME: 0.12 SEC
BH CV ECHO MEAS - PA MAX PG (FULL): 1.8 MMHG
BH CV ECHO MEAS - PA MAX PG: 4.6 MMHG
BH CV ECHO MEAS - PA PR(ACCEL): 26.7 MMHG
BH CV ECHO MEAS - PA V2 MAX: 107.2 CM/SEC
BH CV ECHO MEAS - PULM A REVS DUR: 0.1 SEC
BH CV ECHO MEAS - PULM A REVS VEL: 34.4 CM/SEC
BH CV ECHO MEAS - PULM DIAS VEL: 37.3 CM/SEC
BH CV ECHO MEAS - PULM S/D: 1.3
BH CV ECHO MEAS - PULM SYS VEL: 50 CM/SEC
BH CV ECHO MEAS - PVA(V,A): 1.8 CM^2
BH CV ECHO MEAS - PVA(V,D): 1.8 CM^2
BH CV ECHO MEAS - QP/QS: 0.89
BH CV ECHO MEAS - RAP SYSTOLE: 3 MMHG
BH CV ECHO MEAS - RV MAX PG: 2.8 MMHG
BH CV ECHO MEAS - RV MEAN PG: 1.7 MMHG
BH CV ECHO MEAS - RV V1 MAX: 82.9 CM/SEC
BH CV ECHO MEAS - RV V1 MEAN: 60.7 CM/SEC
BH CV ECHO MEAS - RV V1 VTI: 16 CM
BH CV ECHO MEAS - RVOT AREA: 2.3 CM^2
BH CV ECHO MEAS - RVOT DIAM: 1.7 CM
BH CV ECHO MEAS - SI(AO): 56.7 ML/M^2
BH CV ECHO MEAS - SI(CUBED): 22.2 ML/M^2
BH CV ECHO MEAS - SI(LVOT): 21 ML/M^2
BH CV ECHO MEAS - SI(MOD-SP2): 22.1 ML/M^2
BH CV ECHO MEAS - SI(MOD-SP4): 36.1 ML/M^2
BH CV ECHO MEAS - SI(TEICH): 21.5 ML/M^2
BH CV ECHO MEAS - SUP REN AO DIAM: 2 CM
BH CV ECHO MEAS - SV(AO): 113 ML
BH CV ECHO MEAS - SV(CUBED): 44.3 ML
BH CV ECHO MEAS - SV(LVOT): 41.8 ML
BH CV ECHO MEAS - SV(MOD-SP2): 44 ML
BH CV ECHO MEAS - SV(MOD-SP4): 72 ML
BH CV ECHO MEAS - SV(RVOT): 37.3 ML
BH CV ECHO MEAS - SV(TEICH): 42.9 ML
BH CV ECHO MEAS - TAPSE (>1.6): 2 CM2
BH CV XLRA - RV BASE: 2.1 CM
BH CV XLRA - TDI S': 13 CM/SEC
CREAT BLDA-MCNC: 0.8 MG/DL (ref 0.6–1.3)
E/E' RATIO: 7
LEFT ATRIUM VOLUME INDEX: 9 ML/M2
LV EF 2D ECHO EST: 66 %
SINUS: 2.9 CM
STJ: 2.8 CM

## 2018-02-07 PROCEDURE — 71275 CT ANGIOGRAPHY CHEST: CPT

## 2018-02-07 PROCEDURE — 93306 TTE W/DOPPLER COMPLETE: CPT

## 2018-02-07 PROCEDURE — 82565 ASSAY OF CREATININE: CPT

## 2018-02-07 PROCEDURE — 93306 TTE W/DOPPLER COMPLETE: CPT | Performed by: INTERNAL MEDICINE

## 2018-02-07 PROCEDURE — 0 IOPAMIDOL PER 1 ML: Performed by: NURSE PRACTITIONER

## 2018-02-07 RX ADMIN — IOPAMIDOL 97 ML: 755 INJECTION, SOLUTION INTRAVENOUS at 07:50

## 2018-02-07 NOTE — NURSING NOTE
Dr. Ruff called after reading patient's CT.  She stated that the patient may leave.  IV was D/Hubert and site was satisfactory.  Gauze dressing was applied after hemostasis achieved.

## 2018-02-08 ENCOUNTER — TELEPHONE (OUTPATIENT)
Dept: CARDIOLOGY | Facility: CLINIC | Age: 38
End: 2018-02-08

## 2018-02-08 NOTE — TELEPHONE ENCOUNTER
Echo reviewed. CTA normal. Pt attempted to wean off Toprol but heart staying about 110. advised 12.5 mg daily

## 2018-04-10 ENCOUNTER — OFFICE (OUTPATIENT)
Dept: URBAN - METROPOLITAN AREA CLINIC 75 | Facility: CLINIC | Age: 38
End: 2018-04-10

## 2018-04-10 VITALS
HEART RATE: 88 BPM | SYSTOLIC BLOOD PRESSURE: 122 MMHG | HEIGHT: 71 IN | WEIGHT: 176 LBS | DIASTOLIC BLOOD PRESSURE: 70 MMHG

## 2018-04-10 DIAGNOSIS — Z92.25 PERSONAL HISTORY OF IMMUNOSUPPRESSION THERAPY: ICD-10-CM

## 2018-04-10 DIAGNOSIS — M25.50 PAIN IN UNSPECIFIED JOINT: ICD-10-CM

## 2018-04-10 DIAGNOSIS — K59.00 CONSTIPATION, UNSPECIFIED: ICD-10-CM

## 2018-04-10 DIAGNOSIS — K50.90 CROHN'S DISEASE, UNSPECIFIED, WITHOUT COMPLICATIONS: ICD-10-CM

## 2018-04-10 PROCEDURE — 99214 OFFICE O/P EST MOD 30 MIN: CPT

## 2018-08-07 ENCOUNTER — OFFICE (OUTPATIENT)
Dept: URBAN - METROPOLITAN AREA CLINIC 75 | Facility: CLINIC | Age: 38
End: 2018-08-07

## 2018-08-07 VITALS
HEIGHT: 71 IN | HEART RATE: 97 BPM | DIASTOLIC BLOOD PRESSURE: 70 MMHG | WEIGHT: 169 LBS | SYSTOLIC BLOOD PRESSURE: 122 MMHG

## 2018-08-07 DIAGNOSIS — K50.90 CROHN'S DISEASE, UNSPECIFIED, WITHOUT COMPLICATIONS: ICD-10-CM

## 2018-08-07 DIAGNOSIS — K21.9 GASTRO-ESOPHAGEAL REFLUX DISEASE WITHOUT ESOPHAGITIS: ICD-10-CM

## 2018-08-07 DIAGNOSIS — Z79.52 LONG TERM (CURRENT) USE OF SYSTEMIC STEROIDS: ICD-10-CM

## 2018-08-07 DIAGNOSIS — R10.10 UPPER ABDOMINAL PAIN, UNSPECIFIED: ICD-10-CM

## 2018-08-07 DIAGNOSIS — Z92.25 PERSONAL HISTORY OF IMMUNOSUPPRESSION THERAPY: ICD-10-CM

## 2018-08-07 DIAGNOSIS — K58.9 IRRITABLE BOWEL SYNDROME WITHOUT DIARRHEA: ICD-10-CM

## 2018-08-07 PROCEDURE — 99215 OFFICE O/P EST HI 40 MIN: CPT

## 2018-09-18 VITALS
RESPIRATION RATE: 12 BRPM | WEIGHT: 164 LBS | HEART RATE: 101 BPM | SYSTOLIC BLOOD PRESSURE: 128 MMHG | RESPIRATION RATE: 13 BRPM | HEART RATE: 110 BPM | DIASTOLIC BLOOD PRESSURE: 91 MMHG | RESPIRATION RATE: 17 BRPM | HEIGHT: 71 IN | HEART RATE: 109 BPM | TEMPERATURE: 98.4 F | DIASTOLIC BLOOD PRESSURE: 88 MMHG | DIASTOLIC BLOOD PRESSURE: 74 MMHG | RESPIRATION RATE: 14 BRPM | SYSTOLIC BLOOD PRESSURE: 132 MMHG | HEART RATE: 133 BPM | DIASTOLIC BLOOD PRESSURE: 84 MMHG | HEART RATE: 99 BPM | HEART RATE: 113 BPM | DIASTOLIC BLOOD PRESSURE: 98 MMHG | SYSTOLIC BLOOD PRESSURE: 130 MMHG | DIASTOLIC BLOOD PRESSURE: 79 MMHG | RESPIRATION RATE: 16 BRPM | DIASTOLIC BLOOD PRESSURE: 96 MMHG | HEART RATE: 105 BPM | SYSTOLIC BLOOD PRESSURE: 136 MMHG | SYSTOLIC BLOOD PRESSURE: 120 MMHG | OXYGEN SATURATION: 97 % | HEART RATE: 102 BPM | OXYGEN SATURATION: 98 % | OXYGEN SATURATION: 96 % | SYSTOLIC BLOOD PRESSURE: 147 MMHG | OXYGEN SATURATION: 100 % | TEMPERATURE: 98.5 F | DIASTOLIC BLOOD PRESSURE: 86 MMHG | SYSTOLIC BLOOD PRESSURE: 134 MMHG | SYSTOLIC BLOOD PRESSURE: 153 MMHG | DIASTOLIC BLOOD PRESSURE: 92 MMHG | RESPIRATION RATE: 15 BRPM | RESPIRATION RATE: 21 BRPM | OXYGEN SATURATION: 99 % | DIASTOLIC BLOOD PRESSURE: 95 MMHG | SYSTOLIC BLOOD PRESSURE: 121 MMHG | SYSTOLIC BLOOD PRESSURE: 126 MMHG | HEART RATE: 108 BPM | SYSTOLIC BLOOD PRESSURE: 133 MMHG | HEART RATE: 111 BPM

## 2018-09-19 ENCOUNTER — OFFICE (OUTPATIENT)
Dept: URBAN - METROPOLITAN AREA PATHOLOGY 4 | Facility: PATHOLOGY | Age: 38
End: 2018-09-19
Payer: COMMERCIAL

## 2018-09-19 ENCOUNTER — AMBULATORY SURGICAL CENTER (OUTPATIENT)
Dept: URBAN - METROPOLITAN AREA SURGERY 17 | Facility: SURGERY | Age: 38
End: 2018-09-19

## 2018-09-19 DIAGNOSIS — K75.81 NONALCOHOLIC STEATOHEPATITIS (NASH): ICD-10-CM

## 2018-09-19 DIAGNOSIS — K21.0 GASTRO-ESOPHAGEAL REFLUX DISEASE WITH ESOPHAGITIS: ICD-10-CM

## 2018-09-19 DIAGNOSIS — K50.90 CROHN'S DISEASE, UNSPECIFIED, WITHOUT COMPLICATIONS: ICD-10-CM

## 2018-09-19 DIAGNOSIS — K21.9 GASTRO-ESOPHAGEAL REFLUX DISEASE WITHOUT ESOPHAGITIS: ICD-10-CM

## 2018-09-19 LAB
GI HISTOLOGY: A. UNSPECIFIED: (no result)
GI HISTOLOGY: B. UNSPECIFIED: (no result)
GI HISTOLOGY: C. SELECT: (no result)
GI HISTOLOGY: D. SELECT: (no result)
GI HISTOLOGY: PDF REPORT: (no result)

## 2018-09-19 PROCEDURE — 43239 EGD BIOPSY SINGLE/MULTIPLE: CPT | Performed by: INTERNAL MEDICINE

## 2018-09-19 PROCEDURE — 45380 COLONOSCOPY AND BIOPSY: CPT | Performed by: INTERNAL MEDICINE

## 2018-09-19 PROCEDURE — 88305 TISSUE EXAM BY PATHOLOGIST: CPT | Performed by: INTERNAL MEDICINE

## 2018-09-23 NOTE — PROGRESS NOTES
"  Date of Office Visit: 2018  Encounter Provider: ROXANA Archer  Place of Service: [facility] CARDIOLOGY  Patient Name: Porsha Vega  :1980      Chief Complaint   Patient presents with   • Palpitations   :     Dear Dr. Pena,     HPI: Porsha Vega is a pleasant 38 y.o. female who presents today for evaluation of \"irregular heartbeat\". She is a new patient to me and her previous records have been reviewed.     She has a history of Crohn's disease and reactive tachycardia.  She previously was on diltiazem and which endocrinology switched to Toprol because of her thyroid.  She has tried to wean off Toprol in the past, but then develops tachycardia and PVCs.  She is an established patient of Dr. Claudia Wetzel.  She was last seen in our office in 2018 and was experiencing chest pain at that time.  A CTA of the chest was completed because she had been on oral contraceptive use and was felt to be at higher risk for blood clots.  The CTA was negative.  She then had a 2-D echocardiogram completed which revealed a normal ejection fraction of 66%, trace mitral valve regurgitation, and trace tricuspid valve regurgitation.  She was advised to stay on Toprol-XL 12.5 mg daily.    She presents today for discussion of hypotension, tachycardia, and irregular heartbeat.  She was diagnosed with AMADOR last year.  She fasted for liver biopsy and did not take her Toprol-XL on .  She said she had previously been taking Toprol-XL 50 mg daily, although cardiology did not prescribe this dosage.  She said her blood pressure that day was low systolic 70s and heart rate was elevated.  She was also told at one point that she had a \"irregular heartbeat\".  On  she had a colonoscopy completed for evaluation of her Crohn's disease.  Before starting the procedure she was told that she had \"an irregular heartbeat\" and this is noted on the documentation although I do not have a " "rhythm strip.  Also at that time her blood pressure was 130/92 and heart rate 108.  Her heart rate mildly increased to 133 and then went back to a baseline of about 110.  She was recommended to follow-up in our office.    She says occasionally she feels her heart \"acting up\" in which she will feel some skipped heartbeats for a few minutes with no other associated symptoms.  She is having this may be once every few days.  She decreased her Toprol-XL from 50 mg to 12.5 mg daily on her own accord.  She feels that that dosage is doing fine for her.  She denies chest pain, shortness of air, PND, orthopnea, edema, and syncope.  Occasionally she'll have some mild dizziness/lightheadedness with quick positional changes.    Past Medical History:   Diagnosis Date   • Anemia    • Anxiety    • Arthritis    • Asthma    • Crohn's disease (CMS/HCC)     arthropathy   • Endometriosis    • GERD (gastroesophageal reflux disease)    • Hypertension    • Hypothyroidism    • IBS (irritable bowel syndrome)    • MRSA (methicillin resistant Staphylococcus aureus)    • AMADOR (nonalcoholic steatohepatitis) 9/24/2018   • Pancreatitis    • PUD (peptic ulcer disease)    • Seasonal allergies    • Tachycardia        Past Surgical History:   Procedure Laterality Date   • CHOLECYSTECTOMY     • CHOLECYSTECTOMY     • DIAGNOSTIC LAPAROSCOPY     • ENDOSCOPY     • LIVER BIOPSY     • OTHER SURGICAL HISTORY      laparoscopic surgery for endometriosis   • THYROID BIOPSY         Social History     Social History   • Marital status:      Spouse name: N/A   • Number of children: N/A   • Years of education: N/A     Occupational History   • Not on file.     Social History Main Topics   • Smoking status: Never Smoker   • Smokeless tobacco: Not on file   • Alcohol use No   • Drug use: No   • Sexual activity: Not on file       Family History   Problem Relation Age of Onset   • Hypothyroidism Mother    • Heart disease Father    • Hypothyroidism Father    • " Stroke Father    • Asthma Sister    • Hypothyroidism Sister    • Alcohol abuse Other         x2   • Cancer Other    • Diabetes Other    • Heart disease Other    • Stroke Other        The following portion of the patient's history were reviewed and updated as appropriate: past medical history, past surgical history, past social history, past family history, allergies, current medications, and problem list.    Review of Systems   Constitution: Positive for malaise/fatigue. Negative for chills, diaphoresis, fever, night sweats, weight gain and weight loss.   HENT: Negative for hearing loss, nosebleeds, sore throat and tinnitus.    Eyes: Negative for blurred vision, double vision, pain and visual disturbance.   Cardiovascular: Positive for irregular heartbeat. Negative for chest pain, claudication, cyanosis, dyspnea on exertion, leg swelling, near-syncope, orthopnea, palpitations, paroxysmal nocturnal dyspnea and syncope.   Respiratory: Negative for cough, hemoptysis, shortness of breath, snoring and wheezing.    Endocrine: Negative for cold intolerance, heat intolerance and polyuria.   Hematologic/Lymphatic: Negative for bleeding problem. Does not bruise/bleed easily.   Skin: Negative for color change, dry skin, flushing and itching.   Musculoskeletal: Positive for joint pain. Negative for falls, joint swelling, muscle cramps, muscle weakness and myalgias.   Gastrointestinal: Negative for abdominal pain, constipation, heartburn, melena, nausea and vomiting.   Genitourinary: Negative for dysuria and hematuria.   Neurological: Positive for excessive daytime sleepiness. Negative for dizziness, light-headedness, loss of balance, numbness, paresthesias, seizures and vertigo.   Psychiatric/Behavioral: Negative for altered mental status, depression, memory loss and substance abuse. The patient is nervous/anxious. The patient does not have insomnia.    Allergic/Immunologic: Negative for environmental allergies.       Allergies  "  Allergen Reactions   • Demerol [Meperidine]    • Remicade [Infliximab]          Current Outpatient Prescriptions:   •  ALPRAZolam (XANAX) 0.25 MG tablet, Take 0.25 mg by mouth Daily., Disp: , Rfl:   •  amitriptyline (ELAVIL) 10 MG tablet, Take 10 mg by mouth Every Night., Disp: , Rfl:   •  Calcium Citrate-Vitamin D ( CALCIUM CITRATE+/VIT D3 PO), Take 1 tablet by mouth Daily. 630 mg/500  iu one tablet daily, Disp: , Rfl:   •  FLUoxetine (PROzac) 20 MG capsule, Take 20 mg by mouth Daily., Disp: , Rfl:   •  levothyroxine (SYNTHROID, LEVOTHROID) 200 MCG tablet, Take 200 mcg by mouth Daily., Disp: , Rfl: 5  •  Multiple Vitamin (MULTI VITAMIN DAILY PO), Take 1 tablet by mouth Daily., Disp: , Rfl:   •  predniSONE (DELTASONE) 5 MG tablet, , Disp: , Rfl:   •  traMADol ER (ULTRAM-ER) 300 MG 24 hr tablet, TAKE 1 TABLET EVERY DAY, Disp: , Rfl: 1  •  Ustekinumab (STELARA SC), Inject  under the skin. Injection 1 every 8 weeks, Disp: , Rfl:   •  metoprolol succinate XL (TOPROL-XL) 25 MG 24 hr tablet, Take 0.5 tablets by mouth Daily., Disp: 90 tablet, Rfl: 2        Objective:     Vitals:    09/24/18 0940   BP: 98/60   Pulse: 102   Weight: 73.9 kg (163 lb)   Height: 180.3 cm (71\")     Body mass index is 22.73 kg/m².    PHYSICAL EXAM:    Vitals Reviewed.   General Appearance: No acute distress, well developed and well nourished.   Eyes: Conjunctiva and lids: No erythema, swelling, or discharge. Sclera non-icteric.   HENT: Atraumatic, normocephalic. External eyes, ears, and nose normal. No hearing loss noted. Mucous membranes normal. Lips not cyanotic. Neck supple with no tenderness.  Respiratory: No signs of respiratory distress. Respiration rhythm and depth normal.   Clear to auscultation. No rales, crackles, rhonchi, or wheezing auscultated.   Cardiovascular:  Jugular Venous Pressure: Normal  Heart Rate and Rhythm: Normal rhythm; tachycardiac.  Heart Sounds: Normal S1 and S2. No S3 or S4 noted.  Murmurs: No murmurs noted. No " "rubs, thrills, or gallops.   Arterial Pulses: Carotid pulses normal. No carotid bruit noted. Posterior tibialis and dorsalis pedis pulses normal.   Lower Extremities: No edema noted.  Gastrointestinal:  Abdomen soft, non-distended, non-tender. Normal bowel sounds. No hepatomegaly.   Musculoskeletal: Normal movement of extremities  Skin and Nails: General appearance normal. No pallor, cyanosis, diaphoresis. Skin temperature normal. No clubbing of fingernails.   Psychiatric: Patient alert and oriented to person, place, and time. Speech and behavior appropriate. Normal mood and affect.       ECG 12 Lead  Date/Time: 9/24/2018 9:34 AM  Performed by: SALIMA EATON  Authorized by: SALIMA EATON   Comparison: compared with previous ECG from 1/31/2018  Similar to previous ECG  Rhythm: sinus tachycardia  Rate: tachycardic  BPM: 102  Conduction: conduction normal  ST Segments: ST segments normal  T Waves: T waves normal  Other: no other findings  Clinical impression: abnormal ECG              Assessment:       Diagnosis Plan   1. Irregular heartbeat     2. Tachycardia     3. Hypotension due to drugs            Plan:      She has a history of tachycardia and PVCs.  On her last visit she was recommended to continue with Toprol-XL 12.5 mg daily.  She said someone has increased her Toprol-XL to 50 mg over the past few months and it was not cardiology.  She is currently taking Toprol-XL 12.5 mg and her blood pressure and heart rate are stable for her.  I recommended that we continue with the current dosage of Toprol-XL.  She was also concerned that she was told that she had \"irregular heartbeat\" in the setting of a liver biopsy and colonoscopy.  She does not know what the irregular heartbeat really meant nor do we have strips.  We requested the records and they did not send any strips. I recommended that we just continue to monitor and she will follow-up with me for further evaluation 6 weeks, unless otherwise needed " sooner.    As always, it has been a pleasure to participate in your patient's care. Thank you.       Sincerely,         ROXANA Echavarria

## 2018-09-24 ENCOUNTER — OFFICE VISIT (OUTPATIENT)
Dept: CARDIOLOGY | Facility: CLINIC | Age: 38
End: 2018-09-24

## 2018-09-24 VITALS
BODY MASS INDEX: 22.82 KG/M2 | WEIGHT: 163 LBS | HEIGHT: 71 IN | SYSTOLIC BLOOD PRESSURE: 98 MMHG | HEART RATE: 102 BPM | DIASTOLIC BLOOD PRESSURE: 60 MMHG

## 2018-09-24 DIAGNOSIS — I95.2 HYPOTENSION DUE TO DRUGS: ICD-10-CM

## 2018-09-24 DIAGNOSIS — I49.9 IRREGULAR HEARTBEAT: Primary | ICD-10-CM

## 2018-09-24 DIAGNOSIS — R00.0 TACHYCARDIA: ICD-10-CM

## 2018-09-24 PROBLEM — K75.81 NASH (NONALCOHOLIC STEATOHEPATITIS): Status: ACTIVE | Noted: 2018-09-24

## 2018-09-24 PROBLEM — R50.9 LOW GRADE FEVER: Status: RESOLVED | Noted: 2017-03-14 | Resolved: 2018-09-24

## 2018-09-24 PROBLEM — R07.2 PRECORDIAL PAIN: Status: RESOLVED | Noted: 2018-01-31 | Resolved: 2018-09-24

## 2018-09-24 PROBLEM — R06.09 DYSPNEA ON EXERTION: Status: RESOLVED | Noted: 2018-01-31 | Resolved: 2018-09-24

## 2018-09-24 PROBLEM — K75.81 NASH (NONALCOHOLIC STEATOHEPATITIS): Status: RESOLVED | Noted: 2018-09-24 | Resolved: 2018-09-24

## 2018-09-24 PROCEDURE — 99214 OFFICE O/P EST MOD 30 MIN: CPT | Performed by: NURSE PRACTITIONER

## 2018-09-24 PROCEDURE — 93000 ELECTROCARDIOGRAM COMPLETE: CPT | Performed by: NURSE PRACTITIONER

## 2018-09-24 RX ORDER — METOPROLOL SUCCINATE 25 MG
12.5 TABLET, EXTENDED RELEASE 24 HR ORAL DAILY
COMMUNITY
End: 2018-09-24

## 2018-09-24 RX ORDER — AMITRIPTYLINE HYDROCHLORIDE 10 MG/1
10 TABLET, FILM COATED ORAL NIGHTLY
COMMUNITY

## 2018-09-24 RX ORDER — METOPROLOL SUCCINATE 25 MG/1
12.5 TABLET, EXTENDED RELEASE ORAL DAILY
Qty: 90 TABLET | Refills: 2 | Status: SHIPPED | OUTPATIENT
Start: 2018-09-24 | End: 2019-01-29 | Stop reason: SDUPTHER

## 2018-10-19 ENCOUNTER — OFFICE (OUTPATIENT)
Dept: URBAN - METROPOLITAN AREA CLINIC 75 | Facility: CLINIC | Age: 38
End: 2018-10-19

## 2018-10-19 VITALS
SYSTOLIC BLOOD PRESSURE: 122 MMHG | HEART RATE: 111 BPM | HEIGHT: 71 IN | WEIGHT: 163 LBS | DIASTOLIC BLOOD PRESSURE: 70 MMHG

## 2018-10-19 DIAGNOSIS — Z92.25 PERSONAL HISTORY OF IMMUNOSUPPRESSION THERAPY: ICD-10-CM

## 2018-10-19 DIAGNOSIS — K58.9 IRRITABLE BOWEL SYNDROME WITHOUT DIARRHEA: ICD-10-CM

## 2018-10-19 DIAGNOSIS — K50.90 CROHN'S DISEASE, UNSPECIFIED, WITHOUT COMPLICATIONS: ICD-10-CM

## 2018-10-19 DIAGNOSIS — R74.8 ABNORMAL LEVELS OF OTHER SERUM ENZYMES: ICD-10-CM

## 2018-10-19 DIAGNOSIS — K59.00 CONSTIPATION, UNSPECIFIED: ICD-10-CM

## 2018-10-19 DIAGNOSIS — Z79.52 LONG TERM (CURRENT) USE OF SYSTEMIC STEROIDS: ICD-10-CM

## 2018-10-19 PROCEDURE — 99214 OFFICE O/P EST MOD 30 MIN: CPT

## 2018-10-19 RX ORDER — LUBIPROSTONE 8 UG/1
CAPSULE, GELATIN COATED ORAL
Qty: 60 | Refills: 1 | Status: COMPLETED
End: 2020-04-01

## 2018-12-07 ENCOUNTER — OFFICE (OUTPATIENT)
Dept: URBAN - METROPOLITAN AREA CLINIC 75 | Facility: CLINIC | Age: 38
End: 2018-12-07

## 2018-12-07 VITALS
HEIGHT: 71 IN | HEART RATE: 96 BPM | SYSTOLIC BLOOD PRESSURE: 102 MMHG | WEIGHT: 163 LBS | DIASTOLIC BLOOD PRESSURE: 70 MMHG

## 2018-12-07 DIAGNOSIS — Z79.52 LONG TERM (CURRENT) USE OF SYSTEMIC STEROIDS: ICD-10-CM

## 2018-12-07 DIAGNOSIS — R10.31 RIGHT LOWER QUADRANT PAIN: ICD-10-CM

## 2018-12-07 DIAGNOSIS — M25.50 PAIN IN UNSPECIFIED JOINT: ICD-10-CM

## 2018-12-07 DIAGNOSIS — Z92.25 PERSONAL HISTORY OF IMMUNOSUPPRESSION THERAPY: ICD-10-CM

## 2018-12-07 DIAGNOSIS — K50.90 CROHN'S DISEASE, UNSPECIFIED, WITHOUT COMPLICATIONS: ICD-10-CM

## 2018-12-07 PROCEDURE — 99215 OFFICE O/P EST HI 40 MIN: CPT

## 2018-12-07 RX ORDER — LUBIPROSTONE 8 UG/1
CAPSULE, GELATIN COATED ORAL
Qty: 60 | Refills: 1 | Status: COMPLETED
End: 2020-04-01

## 2018-12-07 NOTE — SERVICEHPINOTES
October 2012…..Violetta was last seen in the office in November 2011. At that time her primary complaint was gastroesophageal reflux disease treated with proton except the a.m. and Maalox in the evening HEENT and elevated they have her bed. EGD with probable pH monitoring was also then performed and it demonstrated minimal supine increased reflux, but this is a normal study.Emre inflammatory bowel disease symptoms of been well controlled in the past with Humira.Emre last colonoscopy was performed in October 2010 and demonstrated findings suspicious for ileocolonic Crohn's disease. There was also a component of irritable bowel syndrome of continued complaints of low abdominal pain in the right lower quadrant and diffusely.BRIn Feb she had a flare up and increased prednisone after a Medrol dose pack per Dr Vo. Calmed down now. Increased symptoms a few weeks ago. Pain was mostly epigastric and required Librax again. Now increased caffeine and could be contributing to symptoms. On Ultram ER for joint pain and is helping. Still in pain daily.BRConstipation recently worse with laxatives BRWe have followed the patient for Crohn's ileocolitis. Baseline IBD symptoms have abd pain worse at work since last visit. The patient stable been doing well overall. They are considered by the patient to be moderate in nature. The patient also reports the following potential extraintestinal symptom manifestations: joint pain and changing work set up. Stress seems to be a major component in current symptoms. She continues to have fatigue and made attempts to modify the work place with an OT to reorganize. Sx better only for about 1 week of the time on Humira. Extra intestinal manifestations with joints have become an increasing problem.January 30, 2013.…We have followed the patient for Crohn's ileocolitis. Disease course has been stable on current medication. Currently on treatment with Humira weekly and Prednisone 10 mg. Baseline IBD symptoms have persisted arthritis symptoms since last visit. The patient has been doing well overall. Currently having 1 time a day bowel movement(s) per day. Active GI symptoms include nothing currently. They are considered by the patient to be mild in nature. The patient also reports the following potential extraintestinal symptom manifestations: back pain and joint pain. GERD is also increased in symptoms. Increased fatigue. No depression The patient is seen for the evaluation of suspected GERD. Noted the onset of heartburn and regurgitation a few years ago. During a given day, they are most prevalent shortly after eating meals in the daytime. On this therapy, symptom response has been good. Pertinent positive symptoms include abdominal pain, heartburn. 7/9/13.....GERD is better with less stress. There is continued joint pain. Most Sx are related to food and after lunch. She questions work associated. Increased joint symptoms better o n Humira. Possible seizure late October. EEG pending.6/17/14.....Increased abdominal pain and GERD so started Dexilant. Pain has become more continued. Maalox and tums but occasionally need Librax. She is swimming for exercise. Using the pool. She had an abscess on the cheek of the buttocks then opened. This is healing. Still with some joint symptoms. Discussed the improved LFTs. We have followed the patient for Crohn's ileitis. Currently on treatment with Humira. Baseline IBD symptoms have persisted since last visit. The patient fair been doing well overall. They are considered by the patient to be mild in nature. Alarm symptoms reported: none. The patient also reports the following potential extraintestinal symptom manifestations: joint pain.10/14/14..... Better on the Dexilant. Perianal fistulas now x 2. Now not draining. She is about to draining fistulous tracts now the first was healed transiently with the use of metronidazole. His also has increased liver enzymes that have tended to wax and wane. Current weekly therapy as Humira. We discussed the possibility methotrexate but with elevated liver enzymes this cannot be utilized and particularly was of no benefit in the past. There've been some ongoing intermittent arthritis problem seem to be the biggest frequent issues and we'll plan no change anything.2/18/15..... Most recent labs are significant for hemoglobin 14.9 hematocrit 43.1. CMP is still elevated with /40 and /32. CRP is 0.5/4.9. LFTs have been elevated as far back as 2007 when she had a biopsy. There is been some increase in recurrence of the fistulous tract and were is 1 feels like a 1 is under the skin and uncomfortable. The Flagyl twice daily was called her have some adverse side effects were noted tried cases Cipro which we had discussed previously. She is having some increased abdominal discomfort with self-limited distress and doesn't improve on Librax which is seemingly been the best antispasmodic that we have tried. She has done well in the exploratory laparoscopy both his increased stress questions whether or not we would've to change something else. She does take in the mornings primarily. She thinks her Humira once weekly seemingly is doing fairly well with this therapy. As noted above she continues of elevated liver function tests which have been ongoing since 2007 6/24/15.....recent liver function tests from 6/17/15 are markedly abnormal with ALT increased to 429/78 and AST increased at 267/378/26/15.....Abdominal pain improved. LFTs better off Humira. CT scan performed July 8 2015 showed moderate to large stool [place] status post cholecystectomy and normal biliary and pancreatic ducts. Remaining examination was negative. We discussed the fact the patient is back on her Humira and there is slight increase in the ALT. Other drug that she is on his mirtazapine however she rarely was also that during this test time. She denies nausea vomiting, diarrhea, constipation or change in appetite or change in weight.12/9/15.....Violetta returns today for followup doesn't feel that the Cimzia is helping as it should be is aware is off after 10 days. She is having increased arthritis symptoms while GI symptoms doing reasonably well. She asked me about Simponi. It is approved for arthritis and she may have to get her rheumatologist. We will do this is every 2 week dosing fails. GI symptoms are fairly stable. There is no nausea, vomiting, change in appetite or weight bowel movements are stable We have followed the patient for Crohns ileocolitis. Disease course has been stable on current medication. Currently on treatment with Cimzia. Baseline IBD symptoms have remain the same since last visit. The patient has not due to arthritis symptoms worsening been doing well overall. Currently having bowel movement(s) per day. Active GI symptoms include. They are considered by the patient to be mild in nature. Alarm symptoms reported: none, weight has stabilized. The patient also reports the following potential extraintestinal symptom manifestations: joint pain continues to be an issue. Symptoms have recently caused the patient to change nothing. reflux symptoms are worse when she is had to put her bed flat on the floor because of her  back. She thinks that this may be part of that. She is going to try and elevate the head of the bed little bit more and see how that does. 4/12/16..... We have followed the patient for Crohns ileocolitis. Disease course has been stable on current medication. Currently on treatment with Prednisone and Cimzia. Baseline IBD symptoms have remain the same since last visit. The patient has been doing well overall. Currently having bowel movement(s) per day. Active GI symptoms include LLQ pain and RLQ pain. They are considered by the patient to be mild in nature. Alarm symptoms reported: none. The patient also reports the following potential extraintestinal symptom manifestations: joint pain. Symptoms have recently caused the patient to change nothing. There is increased abdominal pain as a result of increased stress and increased left lower and right lower quadrant discomfort. She is on Cimzia and feels it is helping at this time. She is turned lowering her steroids for her arthritis and has been successful with 1 mg decreases at the present time. Her biggest issue in this very persisting yeast infection after she had 2 abscesses drained recently. There is no fistulization and they have resolved after antibiotic therapy. The last time this happened was 1 year ago and the therapy afterwards was initiated. Theo biggest problem at present is a huge amount of stress which is affecting IBS. We discussed this and there is ongoing personal stress will help decrease once it is resolved. Her biggest problem is current complaint of ongoing vaginal yeast infection and a single dose of fluconazole did not improve it. This began after she was treated with antibiotics for abscess as it is ongoing at present as well.8/23/16....Violetta returns for followup and since I last saw her she is it more difficulty with her migraines and has had a significant evaluation including a trip to the emergency room. Ultimately it was decided that a large component of her migraines or stress-induced. They have started her on amitriptyline therapy which seems to be helping her and told her that she has cataracts need to be removed which are presumptively due to steroid therapy. She's also had recurrent abscesses in the buttocks region that were drained and did not appear to be fistulas again. Reportedly there was staff cultured but she does not know for was MRSA. We're going to obtain those records. She saw Dr. aSmantha Lowe for this. She notes that her stools used to be a [place] stool #1 for now #4. They're painful and she feels that she is constipated. There is some nausea but no vomiting along with that there are low abdominal pain with it. She has recurrent area antibiotics that she is fearful may need to be drained again. They have all stop draining and do not appear I history to have been fistulas. she is currently on 9 mg of prednisone per day and Dr. Vo her rheumatologist wants her to see a pain management specialist and to try and withdraw the steroids at this time..10/10/16....liver biopsy showed: mild lobular and portal hepatitis with focal eosinophils present. Microscopic abnormalities or mild. Suggestive of drug reaction but correlate to other available information. we discussed the fact Cimzia is known returns because hepatitis and she's been off of this for several weeks. Her last labs were done some time ago. She denies any other symptoms at present has started amitriptyline for migraine headaches which also could be a cause for her symptoms. We discussed stopping some of her medications were the time her rechecking liver function tests to try to find a culprit.1/6/17...Violetta presents to follow up on Crohn's disease, elevated LFTs, and GERD. We have been following her LFTs closely and they trended downward when she stopped all her medications. As of 12/21/16 her labs showed CBC H/H 14.4/40.9 CMP nml, AST 34, ALT 59. However, she had a labs in Rheumatologist office on 11/30/16 which showed , . There were no changes to her medications during that time. She has graphed her LFTs since 2007 and found a trend of fluctuating enzymes over the last 10 years. Her Crohn's disease is doing okay off of Cimizia but she is on Prednisone 9mg and has been on this for 4 years. The only other culprit that has not been accounted for is her Depo shot every 3 months--last one was last month. She is planning to discuss her elevated LFTs and the Depo shot with her Gynecologist at the end of this month. Her biopsy on 9/28/16 showed mild lobular and portal hepatitis with focal eosinophils present. Microscopic abnormalities or mild. Suggestive of drug reaction but correlate to other available information was recommended. Her serum liver work up showed marked to severe steatosis with JACOBSON, positive AMSA, F1-F2 fibrosis with severe activity. All others were negative. Currently she is having some epigastric pain since stopping the protonix, periumbilical pain, and some joint pain as well. She has been diagnosed with osteopenia and adrenal insufficiency in the past. She has tried Remicade (reaction), Humira (increased LFTs), and Cimizia (increased LFTs) for her Crohn's.4/5/17...Violetta presents in follow up of Crohn's disease and elevated LFTs. Patient presents acutely with feeling like she is in a crohn's flare. Her symptoms started shortly after the MRCP with increased frequency and watery stools. Her diarrhea was the worse last week and is improving this week. She was initially nauseous. Also with lower abdominal cramping off and on. Excessive flatulence and some bright red blood in stool. She stopped the Cimizia back in August due to increased liver enzymes. She is currently on 9mg of Prednisone daily. She is also have some other health issues to include tachycardia, shaking of hands--she is seeing multiple specialists for this. 1/18/2017 - MRCP results - mild extrahepatic biliary dilatation-10mm size noted but states consistent with cholecystectomy. 3/20/17 CBC H/H 12.7/36.5 CMP Gluc 152, AST 28, ALT 43,  8/9/17... We have followed the patient for Crohns ileocolitis. Disease course has been stable on current medication. Currently on treatment with Prednisone 14mg QD and Stelara infusion 9 weeks ago (pending approval for injection). Baseline IBD symptoms have remain the same since last visit. The patient has been doing fair overall. Currently having 1 bowel movement(s) every 2 days. Active GI symptoms include constipation and tenesmus (overall better than usual). They are considered by the patient to be mild in nature. Alarm symptoms reported: bright red blood in the stool. The patient also reports the following potential extraintestinal symptom manifestations: staph abscess. Symptoms have recently caused the patient to change nothing. June labs: CBC H/H 14.2/41.6 CMP nml. She is having TERRELL for her thyroid and this is improving her overall symptoms. 12/5/17... We have followed the patient for Crohns ileocolitis. Disease course has been Improving on therapy. Currently on treatment with Stelara and prednisone 7mg. Baseline IBD symptoms have improved since last visit. The patient has been doing well overall. Currently having 1 bowel movement(s) per day. Active GI symptoms include nausea--few. They are considered by the patient to be mild in nature. Alarm symptoms reported: none. The patient also reports the following potential extraintestinal symptom manifestations: joint pain and fatigue. Symptoms have recently caused the patient to take pain medication (tramadol ER). 4/10/18... We have followed the patient for Crohns ileocolitis. Disease course has been steroid dependent and stable on current medication. Currently on treatment with Prednisone 7.5 mg (attempted to wean down to 5mg but experienced joint pain and fatigue) and Stelara. Baseline IBD symptoms have remain the same since last visit. The patient has been doing fair. Complaints of constipation. Will have a BM every 3 days. She is using Miralax as needed. overall. ctive GI symptoms include Joint pain. They are considered by the patient to be moderate in nature. Alarm symptoms reported: none. The patient also reports the following potential extraintestinal symptom manifestations: joint pain and fatigue. Symptoms have recently caused the patient to change nothing. She developed chest pain in January was seen by a cardiologist with CT Scan and Echo w/ no etiology. She also reports issues stabilizing thyroid levels. In February she was able to maintain stable TSH. She is also experiencing tremors for the past several months improved with decrease in Zoloft levels. She plans to see a neurologist in the next several months for further evaluation. She denies heartburn, acid reflux, or dysphagia. Her appetite is good. Her weight is stable. 1/10/18 CXR NL, 1/11/18 TB Gold -, HB negative. 8/7/18... We have followed the patient for Crohns ileitis. Disease course has been steroid dependent. Currently on treatment with Stelara and prednisone 8mg (unable to wean down). Baseline IBD symptoms have been symptomatic since last visit. The patient has not been doing well overall. Currently having 2 bowel movement(s) per day. Active GI symptoms include LLQ pain and RLQ pain and one bloody bowel movements recently. She only has 2 BM/week but they are soft. They are considered by the patient to be moderate in nature. Alarm symptoms reported: blood in the stool. The patient also reports the following potential exoms include traintestinal symptom manifestations: fatigue and joint pain. Symptoms have recently caused the patient to change nothing. She does not feel like the Stelara has helped at all and she has been on it about a year. She has tried Enbrel, Cimizia, Humira, and Remicade in the past. Humira and Cimizia were thought to raise her LFTs. Her CN have only showed ileitis and her Crohn's was found on capsule endoscopy. She does report some increase in dyspepsia and was taken off her PPI for the LFT elevation. In regards to LFTs, they seem to go up and down with no discernible cause despite the medications she is on. 10/19/18...Violetta follows up after work up for possible uncontrolled Crohn's disease despite over 1 year use of Stelara. Her Colon and TI were normal on recent colonoscopy. Her Crohn's disease is typically noted in the small intestines. She is currently on Prednisone 7.5mg and Stelara. Her current symptoms include the need to strain to have a BM and even urinate. She started the miralax more regularly which helped increase the frequency but not the consistency. She reports hard stools and bad lower abdomen pain with a BM. No blood in stool. She was concerned for rectal prolapse due to the straining but none was seen on colonoscopy. She tried Movantik in the past through pain management. This caused severe cramping. EGD showed reflux esophagitis but was otherwise normal as well. Labs after last office visit were normal (CBC, CMP, CRP). Deepak also underwent Liver biopsy which showed minimal abnormality despite fluctuating liver enzymes. 12/7/18...Violetta follows up after further work up for possible uncontrolled Crohn's disease. She continues to have RLQ pain which is worse with have a BM. She feels like that area is going to "pop" at times. The straining is some better on Amitiza 8mcg. She has a history of Crohn's disease in the ileum. She has had fistula's and abscesses on her buttocks many years ago. She also has RA and is followed by Rheumatology for this. She has labile liver functions that seemed to be hyperresponsive to medications. She has been removed from several immunosuppressants due to this. She underwent Liver biopsy in August which showed minimal abnormality (see Dx Studies below) Deepak has been on Stelara for over a year. She is steroid dependent on low doses for many years now despite trial of different biologics. She underwent [employer] mucosal monitor testing which showed mucosal healing index 42 (0-40 remission-mild disease) (>50 moderate-severe active disease). Her capsule endoscopy did not show any obvious lesions however it was a poor prep so most of the small intestines were not visualized well. She reports low back pain and lower extremity joint pain likely due to RA. Deepak trialed Amitiza 24mcg which still caused some lower abdominal cramping. She reports the 8mcg PRN helps and doesn't cause cramping. Deepak has trialed Humira, Cimizia, Remicade, Enbrel, Mercaptopurine, methotrexate (liver enzyme elevation but responded best to this). She does not believe she has tried a combo of biologic and thiopurine although I'm not certain her liver functions were tolerate this well.

## 2019-01-03 ENCOUNTER — OFFICE (OUTPATIENT)
Dept: URBAN - METROPOLITAN AREA CLINIC 75 | Facility: CLINIC | Age: 39
End: 2019-01-03

## 2019-01-03 VITALS
HEART RATE: 96 BPM | DIASTOLIC BLOOD PRESSURE: 78 MMHG | HEIGHT: 71 IN | SYSTOLIC BLOOD PRESSURE: 122 MMHG | RESPIRATION RATE: 16 BRPM | WEIGHT: 159 LBS

## 2019-01-03 DIAGNOSIS — K62.89 OTHER SPECIFIED DISEASES OF ANUS AND RECTUM: ICD-10-CM

## 2019-01-03 DIAGNOSIS — R93.3 ABNORMAL FINDINGS ON DIAGNOSTIC IMAGING OF OTHER PARTS OF DI: ICD-10-CM

## 2019-01-03 DIAGNOSIS — R10.31 RIGHT LOWER QUADRANT PAIN: ICD-10-CM

## 2019-01-03 PROCEDURE — 99214 OFFICE O/P EST MOD 30 MIN: CPT

## 2019-01-07 ENCOUNTER — TRANSCRIBE ORDERS (OUTPATIENT)
Dept: ADMINISTRATIVE | Facility: HOSPITAL | Age: 39
End: 2019-01-07

## 2019-01-29 ENCOUNTER — OFFICE VISIT (OUTPATIENT)
Dept: CARDIOLOGY | Facility: CLINIC | Age: 39
End: 2019-01-29

## 2019-01-29 VITALS
HEIGHT: 71 IN | WEIGHT: 156.8 LBS | BODY MASS INDEX: 21.95 KG/M2 | HEART RATE: 92 BPM | DIASTOLIC BLOOD PRESSURE: 60 MMHG | SYSTOLIC BLOOD PRESSURE: 98 MMHG

## 2019-01-29 DIAGNOSIS — F43.9 STRESS: ICD-10-CM

## 2019-01-29 DIAGNOSIS — R00.2 PALPITATIONS: ICD-10-CM

## 2019-01-29 DIAGNOSIS — R00.0 TACHYCARDIA: Primary | ICD-10-CM

## 2019-01-29 DIAGNOSIS — I49.3 PVC'S (PREMATURE VENTRICULAR CONTRACTIONS): ICD-10-CM

## 2019-01-29 PROCEDURE — 93000 ELECTROCARDIOGRAM COMPLETE: CPT | Performed by: NURSE PRACTITIONER

## 2019-01-29 PROCEDURE — 99214 OFFICE O/P EST MOD 30 MIN: CPT | Performed by: NURSE PRACTITIONER

## 2019-01-29 RX ORDER — METOPROLOL SUCCINATE 25 MG/1
25 TABLET, EXTENDED RELEASE ORAL DAILY
Qty: 90 TABLET | Refills: 3 | Status: SHIPPED | OUTPATIENT
Start: 2019-01-29

## 2019-01-29 NOTE — PROGRESS NOTES
Date of Office Visit: 2019  Encounter Provider: ROXANA Archer  Place of Service: [facility] CARDIOLOGY  Patient Name: Porsha Vega  :1980      Chief Complaint   Patient presents with   • Palpitations   :     Dear Dr. Pena,     HPI: Porsha Vega is a pleasant 39 y.o. female who presents today for cardiac follow up. She is a new patient to me and her previous records have been reviewed.  She has a history of Crohn's disease, tachycardia, PVCs, hypertension, and AMADOR.  She had a 2-D echocardiogram 2018 which revealed a normal EF of 66%, trace mitral and tricuspid regurgitation.  She is an established patient of Dr. Claudia Wetzel.    I last saw her in the office on 18 and she reported occasional skipped heartbeats.  She had decrease her Toprol-XL from 50 mg to 12.5 mg on her own and she felt that that was a good dosage for her.    She presents today for follow-up appointment.  She said in December she was having more palpitations in which she describes as skipped heartbeat and tachycardia.  She feels this is all due to stress and she is now having to move to [place] because her  received a new job there.  She has continued with the Toprol-XL 12-1/2 mg daily but is concerned that she's still having these palpitations.  She's been hesitant to increase it because of low blood pressure.  She still feels fatigued.  She denies chest pain, shortness of air, PND, orthopnea, cough, edema, dizziness, or syncope.  She has also recently been diagnosed with a rectal prolapse and said this may be contributing to her weight loss of 10 pounds.    Past Medical History:   Diagnosis Date   • Anemia    • Anxiety    • Arthritis    • Asthma    • Crohn's disease (CMS/HCC)     arthropathy   • Endometriosis    • GERD (gastroesophageal reflux disease)    • Hypertension    • Hypothyroidism    • IBS (irritable bowel syndrome)    • Irregular heartbeat    • MRSA  (methicillin resistant Staphylococcus aureus)    • AMADOR (nonalcoholic steatohepatitis) 9/24/2018   • Pancreatitis    • PUD (peptic ulcer disease)    • Seasonal allergies    • Tachycardia        Past Surgical History:   Procedure Laterality Date   • CHOLECYSTECTOMY     • CHOLECYSTECTOMY     • DIAGNOSTIC LAPAROSCOPY     • ENDOSCOPY     • LIVER BIOPSY     • OTHER SURGICAL HISTORY      laparoscopic surgery for endometriosis   • THYROID BIOPSY         Social History     Socioeconomic History   • Marital status:      Spouse name: Not on file   • Number of children: Not on file   • Years of education: Not on file   • Highest education level: Not on file   Social Needs   • Financial resource strain: Not on file   • Food insecurity - worry: Not on file   • Food insecurity - inability: Not on file   • Transportation needs - medical: Not on file   • Transportation needs - non-medical: Not on file   Occupational History   • Not on file   Tobacco Use   • Smoking status: Never Smoker   • Smokeless tobacco: Never Used   Substance and Sexual Activity   • Alcohol use: No     Comment: Caffeine use   • Drug use: No   • Sexual activity: Not on file   Other Topics Concern   • Not on file   Social History Narrative   • Not on file       Family History   Problem Relation Age of Onset   • Hypothyroidism Mother    • Heart disease Father    • Hypothyroidism Father    • Stroke Father    • Asthma Sister    • Hypothyroidism Sister    • Alcohol abuse Other         x2   • Cancer Other    • Diabetes Other    • Heart disease Other    • Stroke Other        The following portion of the patient's history were reviewed and updated as appropriate: past medical history, past surgical history, past social history, past family history, allergies, current medications, and problem list.    Review of Systems   Constitution: Positive for malaise/fatigue and weight loss. Negative for chills, diaphoresis, fever, night sweats and weight gain.   HENT:  Negative for hearing loss, nosebleeds, sore throat and tinnitus.    Eyes: Negative for blurred vision, double vision, pain and visual disturbance.   Cardiovascular: Positive for palpitations. Negative for chest pain, claudication, cyanosis, dyspnea on exertion, irregular heartbeat, leg swelling, near-syncope, orthopnea, paroxysmal nocturnal dyspnea and syncope.   Respiratory: Negative for cough, hemoptysis, shortness of breath, snoring and wheezing.    Endocrine: Negative for cold intolerance, heat intolerance and polyuria.   Hematologic/Lymphatic: Negative for bleeding problem. Does not bruise/bleed easily.   Skin: Negative for color change, dry skin, flushing and itching.   Musculoskeletal: Negative for falls, joint pain, joint swelling, muscle cramps, muscle weakness and myalgias.   Gastrointestinal: Negative for abdominal pain, constipation, heartburn, melena, nausea and vomiting.   Genitourinary: Negative for dysuria and hematuria.   Neurological: Negative for excessive daytime sleepiness, dizziness, light-headedness, loss of balance, numbness, paresthesias, seizures and vertigo.   Psychiatric/Behavioral: Negative for altered mental status, depression, memory loss and substance abuse. The patient is nervous/anxious. The patient does not have insomnia.    Allergic/Immunologic: Negative for environmental allergies.       Allergies   Allergen Reactions   • Demerol [Meperidine]    • Remicade [Infliximab]          Current Outpatient Medications:   •  ALPRAZolam (XANAX) 0.25 MG tablet, Take 0.25 mg by mouth Daily., Disp: , Rfl:   •  amitriptyline (ELAVIL) 10 MG tablet, Take 10 mg by mouth Every Night., Disp: , Rfl:   •  Calcium Citrate-Vitamin D ( CALCIUM CITRATE+/VIT D3 PO), Take 1 tablet by mouth Daily. 630 mg/500  iu one tablet daily, Disp: , Rfl:   •  FLUoxetine (PROzac) 20 MG capsule, Take 20 mg by mouth Daily., Disp: , Rfl:   •  levothyroxine (SYNTHROID, LEVOTHROID) 200 MCG tablet, Take 200 mcg by mouth  "Daily., Disp: , Rfl: 5  •  metoprolol succinate XL (TOPROL-XL) 25 MG 24 hr tablet, Take 0.5 tablets by mouth Daily., Disp: 90 tablet, Rfl: 2  •  Multiple Vitamin (MULTI VITAMIN DAILY PO), Take 1 tablet by mouth Daily., Disp: , Rfl:   •  predniSONE (DELTASONE) 5 MG tablet, Take 7.5 mg by mouth., Disp: , Rfl:   •  traMADol ER (ULTRAM-ER) 300 MG 24 hr tablet, TAKE 1 TABLET EVERY DAY, Disp: , Rfl: 1  •  Ustekinumab (STELARA SC), Inject  under the skin. Injection 1 every 8 weeks, Disp: , Rfl:         Objective:     Vitals:    01/29/19 1006   BP: 98/60   BP Location: Left arm   Pulse: 92   Weight: 71.1 kg (156 lb 12.8 oz)   Height: 180.3 cm (71\")     Body mass index is 21.87 kg/m².    PHYSICAL EXAM:    Vitals Reviewed.   General Appearance: No acute distress, well developed and well nourished. Thin.   Eyes: Conjunctiva and lids: No erythema, swelling, or discharge. Sclera non-icteric. Glasses.    HENT: Atraumatic, normocephalic. External eyes, ears, and nose normal. No hearing loss noted. Mucous membranes normal. Lips not cyanotic. Neck supple with no tenderness.  Respiratory: No signs of respiratory distress. Respiration rhythm and depth normal.   Clear to auscultation. No rales, crackles, rhonchi, or wheezing auscultated.   Cardiovascular:  Jugular Venous Pressure: Normal  Heart Rate and Rhythm: Normal, Heart Sounds: Normal S1 and S2. No S3 or S4 noted.  Murmurs: No murmurs noted. No rubs, thrills, or gallops.   Arterial Pulses: Carotid pulses normal. No carotid bruit noted. Posterior tibialis and dorsalis pedis pulses normal.   Lower Extremities: No edema noted.  Gastrointestinal:  Abdomen soft, non-distended, non-tender. Normal bowel sounds. No hepatomegaly.   Musculoskeletal: Normal movement of extremities  Skin and Nails: General appearance normal. No pallor, cyanosis, diaphoresis. Skin temperature normal. No clubbing of fingernails.   Psychiatric: Patient alert and oriented to person, place, and time. Speech and " behavior appropriate. Normal mood and affect.       ECG 12 Lead  Date/Time: 1/29/2019 10:09 AM  Performed by: Nancy Perez APRN  Authorized by: Nancy Perez APRN   Comparison: compared with previous ECG from 9/24/2018  Comparison to previous ECG: Sinus tachycardia, heart rate 102  Rhythm: sinus rhythm  Rate: normal  BPM: 92  Conduction: conduction normal  ST Segments: ST segments normal  QRS axis: normal  Other: no other findings  Clinical impression: normal ECG              Assessment:       Diagnosis Plan   1. Tachycardia     2. Palpitations     3. PVC's (premature ventricular contractions)     4. Stress            Plan:     She has a history of tachycardia and PVCs.  She's been having palpitations and faster heartbeats.  She's been hesitant to increase her Toprol because of hypotension.  I've recommended that she try Toprol-XL 12.5 mg twice a day and monitor her blood pressure/heart rate.  I will follow-up with her via telephone in 2-3 weeks.  Hopefully this will improve the amount of palpitations that she is feeling.  She feels this is all related to stress that she is getting ready to undergo a moved to [place].  I've recommended that she establish care with her primary care physician who may be able to manage this.  The family physician may refer her to cardiology in [place].  I will give her refills on her Toprol-XL for 1 year to that she has time to establish care with a provider.  It has been a pleasure taking care of Mrs. Vega.    As always, it has been a pleasure to participate in your patient's care. Thank you.       Sincerely,         ROXANA Echavarria

## 2019-02-13 ENCOUNTER — TELEPHONE (OUTPATIENT)
Dept: CARDIOLOGY | Facility: CLINIC | Age: 39
End: 2019-02-13

## 2019-02-13 NOTE — TELEPHONE ENCOUNTER
----- Message from ROXANA Heath sent at 1/29/2019 12:32 PM EST -----    Call patient in 2-3 weeks to reassess her palpitations, blood pressure, and heart rate on Toprol-XL 25 mg daily (or 12.5 mg BID)

## 2019-02-15 NOTE — TELEPHONE ENCOUNTER
I spoke with her via telephone.  Her palpitations have improved, but not resolved.  She would like to try increasing the metoprolol to 25 mg twice a day and she will monitor her blood pressure.  I have asked her to call me and let me know if this helps.  She is moving to [place].  If she needs new prescription for the increased dosage she will contact the office.

## 2019-02-15 NOTE — TELEPHONE ENCOUNTER
Pt returned your call  She states that she is still having some heart palpitations but not as much as she was.    She can be reached at [phone]

## 2019-03-27 ENCOUNTER — OFFICE VISIT (OUTPATIENT)
Dept: SURGERY | Facility: CLINIC | Age: 39
End: 2019-03-27

## 2019-03-27 VITALS
DIASTOLIC BLOOD PRESSURE: 80 MMHG | SYSTOLIC BLOOD PRESSURE: 116 MMHG | TEMPERATURE: 98.9 F | OXYGEN SATURATION: 99 % | HEART RATE: 106 BPM | BODY MASS INDEX: 22.13 KG/M2 | WEIGHT: 158.1 LBS | HEIGHT: 71 IN

## 2019-03-27 DIAGNOSIS — N81.6 RECTOCELE: Primary | ICD-10-CM

## 2019-03-27 DIAGNOSIS — K50.119 CC (CROHN'S COLITIS), UNSPECIFIED COMPLICATION (HCC): ICD-10-CM

## 2019-03-27 PROBLEM — N80.9 ENDOMETRIOSIS: Status: ACTIVE | Noted: 2019-03-27

## 2019-03-27 PROBLEM — F41.1 GENERALIZED ANXIETY DISORDER: Status: ACTIVE | Noted: 2019-03-27

## 2019-03-27 PROBLEM — G43.109 MIGRAINE AURA WITHOUT HEADACHE: Status: ACTIVE | Noted: 2019-03-27

## 2019-03-27 PROBLEM — E03.9 HYPOTHYROIDISM: Status: ACTIVE | Noted: 2019-03-27

## 2019-03-27 PROCEDURE — 99244 OFF/OP CNSLTJ NEW/EST MOD 40: CPT | Performed by: COLON & RECTAL SURGERY

## 2019-03-27 RX ORDER — LUBIPROSTONE 8 UG/1
CAPSULE ORAL
COMMUNITY

## 2019-03-27 RX ORDER — ALPRAZOLAM 0.5 MG/1
TABLET ORAL
COMMUNITY
Start: 2019-03-26 | End: 2019-03-27

## 2019-03-27 RX ORDER — USTEKINUMAB 90 MG/ML
INJECTION, SOLUTION SUBCUTANEOUS
COMMUNITY
Start: 2019-02-21

## 2019-03-27 RX ORDER — LEVOTHYROXINE SODIUM 175 UG/1
175 TABLET ORAL EVERY MORNING
Refills: 3 | COMMUNITY
Start: 2019-02-21

## 2019-03-27 NOTE — PROGRESS NOTES
Porsha Vega is a 39 y.o. female who is seen as a consult at the request of Chato Mccloud MD for sensation of pelvic pressure    HPI:    Pt with hx Crohn's states she has a rectal prolapse  She has had chronic constipation for years    She noted leakage at posterior vagina, and then it looked like   She does not have to push any tissue back up    She denies blood  She endorses occasional mucus    She has not ever pressed on perineum for BM    She has a BM about once per week  Amitiza helped at first, but     MR defecography shows rectocele  She does not have kids, and is not planning on having any  She has an appt with Dr. Tati Hernandez next week    She is in the process of moving: her  is getting a job with the [employer]    Past Medical History:   Diagnosis Date   • Adrenal insufficiency (CMS/HCC)    • Anemia    • Ankylosing spondylitis lumbar region (CMS/HCC)    • Anxiety    • Arthritis    • Asthma    • Crohn's disease (CMS/HCC)    • DDD (degenerative disc disease), lumbar    • MCGOWAN (dyspnea on exertion) 02/2018   • Drug induced constipation    • Endometriosis    • GERD (gastroesophageal reflux disease)    • Headache disorder    • Hypertension    • Hypothyroidism    • IBS (irritable bowel syndrome)    • Irregular heartbeat    • Left ankle injury 03/09/2019    SEEN AT [facility]   • Migraine    • Mitral regurgitation     TRACE, FOLLOWED BY DR. LULÚ DOSHI   • MRSA (methicillin resistant Staphylococcus aureus) 03/2015    RIGHT THIGH   • AMADOR (nonalcoholic steatohepatitis) 9/24/2018   • AMADOR (nonalcoholic steatohepatitis)    • AMADOR (nonalcoholic steatohepatitis)    • OA (osteoarthritis)    • Pancreatitis 2008   • Perianal fistula    • Poison ivy dermatitis 07/30/2015   • Precordial pain 02/2018   • PUD (peptic ulcer disease)    • PVC (premature ventricular contraction)    • Seasonal allergies    • Seizures (CMS/[facility])    • Tachycardia      FOLLOWED BY DR. LULÚ DOSHI   • Tinnitus, right ear 07/12/2017   •  Tremor    • Tricuspid regurgitation     TRACE, FOLLOWED BY DR. LULÚ DOSHI       Past Surgical History:   Procedure Laterality Date   • ANAL FISTULA REPAIR  2013    SETON PLACEMENT   • ARM LESION/CYST EXCISION Right    • BIOPSY MUSCLE SUPERFICIAL / DEEP Right     DURING CHILDHOOD   • CAPSULE ENDOSCOPY N/A    • CHOLECYSTECTOMY N/A 07/06/2001    LAPAROSCOPIC WITH CHOLANGIOGRAM, DR. GRETTA DELATORRE AT [facility]   • COLONOSCOPY N/A 2011   • COLONOSCOPY N/A 09/04/2002    PT AWAKE AND UNCOMFORTABLE, NO MAC SEDATION, RANDOM BX:SMALL INTESTINE MUCOSA WITH HYPERPLASTIC LYMPHOID AGGREGATES, DR. MIRELA LIVE AT [facility]   • COLONOSCOPY W/ BIOPSIES N/A 09/19/2018    WNL, RANDOM COLON BX   • COLONOSCOPY W/ BIOPSIES N/A 09/19/2018    WNL, RANDOM BX, DR. GUCCI CROCKETT   • ENDOMETRIAL ABLATION N/A 02/02/2005    LAPAROSCOPIC ENDOMETRIOSIS ABLATION, DR. LANA CORLEY AT [facility]   • ENDOSCOPY N/A 11/2011    WITH PH MONITORING, MINIMAL SUPINE INCREASED REFLUX, DR. GUCCI CROCKETT   • ENDOSCOPY N/A 09/19/2018    IRREGULAR MARGINS AND ERYTHEMA IN THE GE JUNCTION, GERD, DR. GUCCI CROCKETT   • ENDOSCOPY N/A 08/26/2016    IRREGULAR MARGINS AND ERYTHEMA COMPATABLE WITH GERD, 7 MM POLYP IN STOMACH, DR. GCUCI CROCKETT   • ENDOSCOPY N/A 07/02/2015   • ENDOSCOPY N/A 01/03/2000    ESOPHAGITIS, GASTRITIS, DR. GRETTA DELATORRE   • ENDOSCOPY AND COLONOSCOPY N/A 11/19/2004    GASTRITIS, CY WNL, DR. MIRELA ENCARNACION AT [facility]   • INCISION AND DRAINAGE ABSCESS Right 03/12/2015    RIGHT HIP AND RIGHT THIGH, (+) MRSA, DR. STEVE ZEPEDA AT [facility]   • INCISION AND DRAINAGE ABSCESS Left 01/14/2016    LEFT BUTTOCK X2, DR. STEVE ZEPEDA AT [facility]   • LIVER BIOPSY  09/2016    MILD LOBULAR PORTAL HEPATITIS WITH FOCAL EOSINOPHILS   • LIVER BIOPSY N/A 08/24/2018    MINIMAL ABNORMALITY, SLIGHT SINUSOIDAL CONGESTION, IRON STAIN NEGATIVE, DR. JAIR SALAS AT [facility]   • THYROID BIOPSY     • THYROID BIOPSY     • TOTAL THYROIDECTOMY      RADIOACTIVE IODINE TREATMENT       Social  History:   reports that she has never smoked. She has never used smokeless tobacco. She reports that she does not drink alcohol or use drugs.      Marriage status:     Family History   Problem Relation Age of Onset   • Hypothyroidism Mother    • Skin cancer Mother    • Heart disease Father    • Hypothyroidism Father    • Stroke Father    • Asthma Sister    • Hypothyroidism Sister    • Alcohol abuse Other         x2   • Heart disease Maternal Grandfather    • Stroke Maternal Grandfather    • Diabetes Paternal Grandmother    • Stroke Paternal Grandmother    • Cancer Paternal Grandfather    • Cancer Maternal Aunt          Current Outpatient Medications:   •  ALPRAZolam (XANAX) 0.25 MG tablet, Take 0.25 mg by mouth Daily., Disp: , Rfl:   •  amitriptyline (ELAVIL) 10 MG tablet, Take 10 mg by mouth Every Night., Disp: , Rfl:   •  FLUoxetine (PROzac) 20 MG capsule, Take 20 mg by mouth Daily., Disp: , Rfl:   •  levonorgestrel (MIRENA, 52 MG,) 20 MCG/24HR IUD, 1 each by Intrauterine route., Disp: , Rfl:   •  levothyroxine (SYNTHROID, LEVOTHROID) 175 MCG tablet, Take 175 mcg by mouth Every Morning., Disp: , Rfl: 3  •  lubiprostone (AMITIZA) 8 MCG capsule, Amitiza 8 mcg capsule  Take 1 capsule every day by oral route., Disp: , Rfl:   •  metoprolol succinate XL (TOPROL-XL) 25 MG 24 hr tablet, Take 1 tablet by mouth Daily., Disp: 90 tablet, Rfl: 3  •  predniSONE (DELTASONE) 5 MG tablet, Take 7.5 mg by mouth., Disp: , Rfl:   •  STELARA 90 MG/ML solution prefilled syringe Injection, , Disp: , Rfl:   •  traMADol ER (ULTRAM-ER) 300 MG 24 hr tablet, TAKE 1 TABLET EVERY DAY, Disp: , Rfl: 1  •  Calcium Citrate-Vitamin D (SM CALCIUM CITRATE+/VIT D3 PO), Take 1 tablet by mouth Daily. 630 mg/500  iu one tablet daily, Disp: , Rfl:   •  Multiple Vitamin (MULTI VITAMIN DAILY PO), Take 1 tablet by mouth Daily., Disp: , Rfl:   •  Ustekinumab (STELARA SC), Inject  under the skin. Injection 1 every 8 weeks, Disp: , Rfl:      Allergy  Demerol [meperidine] and Remicade [infliximab]    Review of Systems   Constitution: Negative for decreased appetite, weakness and weight gain.   HENT: Negative for congestion, hearing loss and hoarse voice.    Eyes: Negative for blurred vision, discharge and visual disturbance.   Cardiovascular: Negative for chest pain, cyanosis and leg swelling.   Respiratory: Negative for cough, shortness of breath, sleep disturbances due to breathing and snoring.    Endocrine: Negative for cold intolerance and heat intolerance.   Hematologic/Lymphatic: Bruises/bleeds easily.   Skin: Negative for itching, poor wound healing and skin cancer.   Musculoskeletal: Positive for arthritis, back pain and joint pain. Negative for joint swelling.   Gastrointestinal: Positive for abdominal pain and constipation. Negative for change in bowel habit and bowel incontinence.   Genitourinary: Positive for dysuria. Negative for bladder incontinence and hematuria.   Neurological: Negative for brief paralysis, excessive daytime sleepiness, dizziness, focal weakness, headaches and light-headedness.   Psychiatric/Behavioral: Negative for altered mental status and hallucinations. The patient does not have insomnia.    Allergic/Immunologic: Negative for HIV exposure and persistent infections.       Vitals:    03/27/19 1315   BP: 116/80   Pulse: 106   Temp: 98.9 °F (37.2 °C)   SpO2: 99%     Body mass index is 22.05 kg/m².    Physical Exam   Constitutional: She is oriented to person, place, and time. She appears well-developed and well-nourished. No distress.   HENT:   Head: Normocephalic and atraumatic.   Nose: Nose normal.   Mouth/Throat: Oropharynx is clear and moist.   Eyes: Conjunctivae and EOM are normal. Pupils are equal, round, and reactive to light.   Neck: Normal range of motion. No tracheal deviation present.   Pulmonary/Chest: Effort normal and breath sounds normal. No respiratory distress.   Abdominal: Soft. Bowel sounds are  normal. She exhibits no distension.   Genitourinary:   Genitourinary Comments: Perianal exam: external hem - wnl.  No rectal prolapse on bear down  HAO- good tone, no masses.  +rectocele   Musculoskeletal: Normal range of motion. She exhibits no edema or deformity.   Neurological: She is alert and oriented to person, place, and time. No cranial nerve deficit. Coordination and gait normal.   Skin: Skin is warm and dry.   Psychiatric: She has a normal mood and affect. Her behavior is normal. Judgment normal.       Review of Medical Record:  I reviewed GI records: pt with Crohn's. Mri shows rectocele    Assessment:  1. Rectocele    2. CC (Crohn's colitis), unspecified complication (CMS/HCC)        Plan:    Discussion with patient she does not have a rectal prolapse.  She does have a rectocele; she should keep appt with Dr. Hernandez next week.    Call or come in if any questions or concerns    RTC prn      Scribed for Stephen Izquierdo MD by Yaneth Tavarez PA-C 3/27/2019  This patient was evaluated by me, recommendations made, documentation reviewed, edited, and revised by me, Stephen Izquierdo MD

## 2019-08-22 ENCOUNTER — OFFICE (OUTPATIENT)
Dept: URBAN - METROPOLITAN AREA CLINIC 75 | Facility: CLINIC | Age: 39
End: 2019-08-22

## 2019-08-22 VITALS
SYSTOLIC BLOOD PRESSURE: 142 MMHG | HEART RATE: 107 BPM | WEIGHT: 158 LBS | HEIGHT: 71 IN | DIASTOLIC BLOOD PRESSURE: 102 MMHG

## 2019-08-22 DIAGNOSIS — Z92.25 PERSONAL HISTORY OF IMMUNOSUPPRESSION THERAPY: ICD-10-CM

## 2019-08-22 DIAGNOSIS — K50.90 CROHN'S DISEASE, UNSPECIFIED, WITHOUT COMPLICATIONS: ICD-10-CM

## 2019-08-22 DIAGNOSIS — M62.9 DISORDER OF MUSCLE, UNSPECIFIED: ICD-10-CM

## 2019-08-22 DIAGNOSIS — K59.00 CONSTIPATION, UNSPECIFIED: ICD-10-CM

## 2019-08-22 DIAGNOSIS — K58.9 IRRITABLE BOWEL SYNDROME WITHOUT DIARRHEA: ICD-10-CM

## 2019-08-22 PROCEDURE — 99214 OFFICE O/P EST MOD 30 MIN: CPT

## 2019-12-26 ENCOUNTER — OFFICE (OUTPATIENT)
Dept: URBAN - METROPOLITAN AREA CLINIC 75 | Facility: CLINIC | Age: 39
End: 2019-12-26

## 2019-12-26 VITALS
DIASTOLIC BLOOD PRESSURE: 70 MMHG | HEIGHT: 71 IN | WEIGHT: 155 LBS | SYSTOLIC BLOOD PRESSURE: 112 MMHG | OXYGEN SATURATION: 99 % | HEART RATE: 90 BPM

## 2019-12-26 DIAGNOSIS — K59.00 CONSTIPATION, UNSPECIFIED: ICD-10-CM

## 2019-12-26 DIAGNOSIS — K50.90 CROHN'S DISEASE, UNSPECIFIED, WITHOUT COMPLICATIONS: ICD-10-CM

## 2019-12-26 DIAGNOSIS — Z92.25 PERSONAL HISTORY OF IMMUNOSUPPRESSION THERAPY: ICD-10-CM

## 2019-12-26 PROCEDURE — 99214 OFFICE O/P EST MOD 30 MIN: CPT

## 2020-04-01 VITALS — HEIGHT: 71 IN | WEIGHT: 158 LBS

## 2020-04-02 ENCOUNTER — TELEHEALTH PROVIDED OTHER THAN IN PATIENT'S HOME (OUTPATIENT)
Dept: URBAN - METROPOLITAN AREA TELEHEALTH 6 | Facility: TELEHEALTH | Age: 40
End: 2020-04-02
Payer: COMMERCIAL

## 2020-04-02 DIAGNOSIS — K50.90 CROHN'S DISEASE, UNSPECIFIED, WITHOUT COMPLICATIONS: ICD-10-CM

## 2020-04-02 DIAGNOSIS — Z92.25 PERSONAL HISTORY OF IMMUNOSUPPRESSION THERAPY: ICD-10-CM

## 2020-04-02 DIAGNOSIS — M25.50 PAIN IN UNSPECIFIED JOINT: ICD-10-CM

## 2020-04-02 DIAGNOSIS — K58.9 IRRITABLE BOWEL SYNDROME WITHOUT DIARRHEA: ICD-10-CM

## 2020-04-02 PROCEDURE — 99214 OFFICE O/P EST MOD 30 MIN: CPT | Mod: 95 | Performed by: INTERNAL MEDICINE

## 2020-07-07 VITALS — WEIGHT: 160 LBS | HEIGHT: 71 IN

## 2020-07-08 ENCOUNTER — TELEHEALTH PROVIDED OTHER THAN IN PATIENT'S HOME (OUTPATIENT)
Dept: URBAN - METROPOLITAN AREA TELEHEALTH 6 | Facility: TELEHEALTH | Age: 40
End: 2020-07-08
Payer: COMMERCIAL

## 2020-07-08 DIAGNOSIS — K21.9 GASTRO-ESOPHAGEAL REFLUX DISEASE WITHOUT ESOPHAGITIS: ICD-10-CM

## 2020-07-08 DIAGNOSIS — L02.91 CUTANEOUS ABSCESS, UNSPECIFIED: ICD-10-CM

## 2020-07-08 DIAGNOSIS — Z79.52 LONG TERM (CURRENT) USE OF SYSTEMIC STEROIDS: ICD-10-CM

## 2020-07-08 DIAGNOSIS — R10.31 RIGHT LOWER QUADRANT PAIN: ICD-10-CM

## 2020-07-08 DIAGNOSIS — K59.00 CONSTIPATION, UNSPECIFIED: ICD-10-CM

## 2020-07-08 DIAGNOSIS — K50.90 CROHN'S DISEASE, UNSPECIFIED, WITHOUT COMPLICATIONS: ICD-10-CM

## 2020-07-08 DIAGNOSIS — R74.8 ABNORMAL LEVELS OF OTHER SERUM ENZYMES: ICD-10-CM

## 2020-07-08 DIAGNOSIS — R10.10 UPPER ABDOMINAL PAIN, UNSPECIFIED: ICD-10-CM

## 2020-07-08 DIAGNOSIS — T50.905S ADVERSE EFFECT OF UNSPECIFIED DRUGS, MEDICAMENTS AND BIOLOGI: ICD-10-CM

## 2020-07-08 DIAGNOSIS — M25.50 PAIN IN UNSPECIFIED JOINT: ICD-10-CM

## 2020-07-08 DIAGNOSIS — R93.3 ABNORMAL FINDINGS ON DIAGNOSTIC IMAGING OF OTHER PARTS OF DI: ICD-10-CM

## 2020-07-08 DIAGNOSIS — K58.9 IRRITABLE BOWEL SYNDROME WITHOUT DIARRHEA: ICD-10-CM

## 2020-07-08 PROCEDURE — 99213 OFFICE O/P EST LOW 20 MIN: CPT | Mod: 95 | Performed by: NURSE PRACTITIONER

## 2020-07-08 RX ORDER — PREDNISONE 5 MG/1
TABLET ORAL
Qty: 270 | Refills: 1 | Status: ACTIVE

## 2020-07-08 RX ORDER — PRUCALOPRIDE 2 MG/1
2 TABLET, FILM COATED ORAL
Qty: 30 | Refills: 11 | Status: ACTIVE
Start: 2020-07-08

## 2021-08-06 ENCOUNTER — APPOINTMENT (OUTPATIENT)
Dept: WOMENS IMAGING | Facility: HOSPITAL | Age: 41
End: 2021-08-06

## 2021-08-06 PROCEDURE — 77067 SCR MAMMO BI INCL CAD: CPT | Performed by: RADIOLOGY

## 2021-08-06 PROCEDURE — 77063 BREAST TOMOSYNTHESIS BI: CPT | Performed by: RADIOLOGY
